# Patient Record
Sex: MALE | Race: WHITE | Employment: FULL TIME | URBAN - METROPOLITAN AREA
[De-identification: names, ages, dates, MRNs, and addresses within clinical notes are randomized per-mention and may not be internally consistent; named-entity substitution may affect disease eponyms.]

---

## 2017-05-18 ENCOUNTER — OFFICE VISIT (OUTPATIENT)
Dept: FAMILY MEDICINE | Facility: OTHER | Age: 32
End: 2017-05-18

## 2017-05-18 VITALS
OXYGEN SATURATION: 92 % | HEART RATE: 89 BPM | SYSTOLIC BLOOD PRESSURE: 126 MMHG | WEIGHT: 266 LBS | RESPIRATION RATE: 16 BRPM | DIASTOLIC BLOOD PRESSURE: 84 MMHG | BODY MASS INDEX: 36.08 KG/M2 | TEMPERATURE: 98.3 F

## 2017-05-18 DIAGNOSIS — R51.9 NONINTRACTABLE HEADACHE, UNSPECIFIED CHRONICITY PATTERN, UNSPECIFIED HEADACHE TYPE: Primary | ICD-10-CM

## 2017-05-18 PROCEDURE — 99207 ZZC NO BILLABLE SERVICE THIS VISIT: CPT | Performed by: FAMILY MEDICINE

## 2017-05-18 ASSESSMENT — PAIN SCALES - GENERAL: PAINLEVEL: MODERATE PAIN (5)

## 2017-05-18 NOTE — NURSING NOTE
Chief Complaint   Patient presents with     Eye Exam For Headaches     Health Maintenance       Initial /84 (BP Location: Right arm, Patient Position: Chair, Cuff Size: Adult Large)  Pulse 89  Temp 98.3  F (36.8  C) (Temporal)  Resp 16  Wt 266 lb (120.7 kg)  SpO2 92%  BMI 36.08 kg/m2 Estimated body mass index is 36.08 kg/(m^2) as calculated from the following:    Height as of 5/9/16: 6' (1.829 m).    Weight as of this encounter: 266 lb (120.7 kg).  Medication Reconciliation: complete   Shelia Tyler CMA (AAMA)

## 2017-05-18 NOTE — MR AVS SNAPSHOT
After Visit Summary   5/18/2017    Moris FARIA [place]    MRN: [id number]           Patient Information     Date Of Birth          1985        Today's Diagnoses     Nonintractable headache, unspecified chronicity pattern, unspecified headache type    -  1       Follow-ups after your visit        Who to contact     If you have questions or need follow up information about today's clinic visit or your schedule please contact [facility] [place] directly at [phone].  Normal or non-critical lab and imaging results will be communicated to you by [email or web address], letter or phone within 4 business days after the clinic has received the results. If you do not hear from us within 7 days, please contact the clinic through Evit or phone. If you have a critical or abnormal lab result, we will notify you by phone as soon as possible.  Submit refill requests through RPost or call your pharmacy and they will forward the refill request to us. Please allow 3 business days for your refill to be completed.          Additional Information About Your Visit        [email or web address] Information     RPost gives you secure access to your electronic health record. If you see a primary care provider, you can also send messages to your care team and make appointments. If you have questions, please call your primary care clinic.  If you do not have a primary care provider, please call [phone] and they will assist you.        Care EveryWhere ID     This is your Care EveryWhere ID. This could be used by other organizations to access your [place] medical records  [id number]        Your Vitals Were     Pulse Temperature Respirations Pulse Oximetry BMI (Body Mass Index)       89 98.3  F (36.8  C) (Temporal) 16 92% 36.08 kg/m2        Blood Pressure from Last 3 Encounters:   05/18/17 126/84   10/21/16 (!) 149/100   10/17/16 (!) 144/109    Weight from Last 3 Encounters:   05/18/17 266 lb (120.7 kg)   10/17/16 280 lb (127  kg)   10/04/16 288 lb (130.6 kg)              Today, you had the following     No orders found for display       Primary Care Provider Office Phone # Fax #    Yaritza Castorena [phone]       [facility]  [address] [facility] [address]        Thank you!     Thank you for choosing [facility]  for your care. Our goal is always to provide you with excellent care. Hearing back from our patients is one way we can continue to improve our services. Please take a few minutes to complete the written survey that you may receive in the mail after your visit with us. Thank you!             Your Updated Medication List - Protect others around you: Learn how to safely use, store and throw away your medicines at www.disposemymeds.org.          This list is accurate as of: 5/18/17  3:32 PM.  Always use your most recent med list.                   Brand Name Dispense Instructions for use    aspirin-acetaminophen-caffeine 250-250-65 MG per tablet    EXCEDRIN MIGRAINE     Take 1 tablet by mouth every 6 hours as needed for headaches Reported on 5/18/2017       GABAPENTIN PO      Reported on 5/18/2017       ibuprofen 200 MG tablet    ADVIL/MOTRIN    1 tablet    Take 4 tablets (800 mg) by mouth every 8 hours as needed for mild pain       oxyCODONE-acetaminophen 5-325 MG per tablet    PERCOCET    20 tablet    Take 1-2 tablets by mouth every 6 hours as needed for moderate to severe pain       TIZANIDINE HCL PO      Reported on 5/18/2017

## 2017-05-18 NOTE — PROGRESS NOTES
SUBJECTIVE:                                                    Moris Bowers is a 31 year old male who presents to clinic today for the following health issues:      HPI    Headache     Onset: x 4 days    Description:   Location: Right side of head/frontal   Character: dull pain, sharp pain  Frequency:  Daily  Duration:  Once takes Ibuprofen headache is gone within 45 minutes    Intensity: moderate, severe, 5/10    Progression of Symptoms:  worsening    Accompanying Signs & Symptoms:  Stiff neck: YES  Neck or upper back pain: YES  Fever: no  Sinus pressure: YES  Nausea or vomiting: no  Dizziness: YES- When patient first stands up  Numbness: no  Weakness: no  Visual changes: YES- Right Eye Seems more blurry- does wear contacts   History:   Head trauma: YES- concussions  Family history of migraines: no  Previous tests for headaches: no  Neurologist evaluations: no  Able to do daily activities: YES  Wake with a headaches: YES  Do headaches wake you up: YES  Daily pain medication use: YES- Ibuprofen as needed  Work/school stressors/changes: YES- Daily with work    Precipitating factors:   Does light make it worse: no  Does sound make it worse: no    Alleviating factors:  Does sleep help: YES         Therapies Tried and outcome: Ibuprofen (Advil, Motrin), Aleve      Problem list and histories reviewed & adjusted, as indicated.  Additional history: as documented  Pt left before being seen

## 2017-05-26 ENCOUNTER — OFFICE VISIT (OUTPATIENT)
Dept: FAMILY MEDICINE | Facility: OTHER | Age: 32
End: 2017-05-26
Payer: COMMERCIAL

## 2017-05-26 VITALS
OXYGEN SATURATION: 98 % | RESPIRATION RATE: 16 BRPM | TEMPERATURE: 95.6 F | HEART RATE: 113 BPM | WEIGHT: 262 LBS | DIASTOLIC BLOOD PRESSURE: 88 MMHG | HEIGHT: 71 IN | BODY MASS INDEX: 36.68 KG/M2 | SYSTOLIC BLOOD PRESSURE: 134 MMHG

## 2017-05-26 DIAGNOSIS — R51.9 NONINTRACTABLE HEADACHE, UNSPECIFIED CHRONICITY PATTERN, UNSPECIFIED HEADACHE TYPE: ICD-10-CM

## 2017-05-26 DIAGNOSIS — J02.9 VIRAL PHARYNGITIS: Primary | ICD-10-CM

## 2017-05-26 LAB
DEPRECATED S PYO AG THROAT QL EIA: NORMAL
MICRO REPORT STATUS: NORMAL
SPECIMEN SOURCE: NORMAL

## 2017-05-26 PROCEDURE — 87880 STREP A ASSAY W/OPTIC: CPT | Performed by: NURSE PRACTITIONER

## 2017-05-26 PROCEDURE — 99213 OFFICE O/P EST LOW 20 MIN: CPT | Performed by: NURSE PRACTITIONER

## 2017-05-26 PROCEDURE — 87081 CULTURE SCREEN ONLY: CPT | Performed by: NURSE PRACTITIONER

## 2017-05-26 ASSESSMENT — PAIN SCALES - GENERAL: PAINLEVEL: MILD PAIN (2)

## 2017-05-26 NOTE — NURSING NOTE
"Chief Complaint   Patient presents with     Pharyngitis       Initial /88  Pulse 113  Temp 95.6  F (35.3  C) (Temporal)  Resp 16  Ht 5' 11.18\" (1.808 m)  Wt 262 lb (118.8 kg)  SpO2 98%  BMI 36.36 kg/m2 Estimated body mass index is 36.36 kg/(m^2) as calculated from the following:    Height as of this encounter: 5' 11.18\" (1.808 m).    Weight as of this encounter: 262 lb (118.8 kg).  Medication Reconciliation: complete  Danielle Mcqueen CMA (AAMA)    "

## 2017-05-26 NOTE — PROGRESS NOTES
"  SUBJECTIVE:                                                    Moris Bowers is a 31 year old male who presents to clinic today for the following health issues:    Acute Illness   Acute illness concerns: sore throat  Onset: yesterday    Fever: no    Chills/Sweats: YES    Headache (location?): YES- last week     Sinus Pressure:no    Conjunctivitis:  YES: Right blurry with headache    Ear Pain: no    Rhinorrhea: no    Congestion: YES    Sore Throat: YES     Cough: YES - productive from chest dark green    Wheeze: YES    Decreased Appetite: no    Nausea: no    Vomiting: no    Diarrhea:  no    Dysuria/Freq.: no    Fatigue/Achiness: no    Sick/Strep Exposure: no     Therapies Tried and outcome: mucinex, coldeze nothing is working.  Came in contact with a deer at work and that's concerning.     Symptoms started yesterday with sore throat. Headache for the last two weeks- Ibuprofen and Excedrin. History of headaches. Intermittently has blurred vision with the headaches. Right now his vision is fine. Positive for productive cough. Congestion. White spots on the back of his throat.     Headaches: Currently has a mild headache. He reports some blurred vision with these headaches. He reports the headaches located on his right side. No nausea or vomiting. He has not been seen for these although this is not new to him.     Problem list and histories reviewed & adjusted, as indicated.  Additional history: as documented    Reviewed and updated as needed this visit by clinical staff       Reviewed and updated as needed this visit by Provider         ROS:  C: NEGATIVE for fever, chills, change in weight    OBJECTIVE:                                                    /88  Pulse 113  Temp 95.6  F (35.3  C) (Temporal)  Resp 16  Ht 5' 11.18\" (1.808 m)  Wt 262 lb (118.8 kg)  SpO2 98%  BMI 36.36 kg/m2  Body mass index is 36.36 kg/(m^2).  Physical Exam   Constitutional: Vital signs are normal.   HENT:   Head: " Normocephalic.   Nose: Nose normal.   Mouth/Throat: Uvula is midline and mucous membranes are normal. No oropharyngeal exudate, posterior oropharyngeal edema, posterior oropharyngeal erythema or tonsillar abscesses.   Eyes: Lids are normal. Pupils are equal, round, and reactive to light.   Cardiovascular: Normal rate, regular rhythm and normal heart sounds.    Pulmonary/Chest: Effort normal and breath sounds normal.   Lymphadenopathy:        Head (right side): No submental, no submandibular, no tonsillar, no preauricular, no posterior auricular and no occipital adenopathy present.        Head (left side): No submental, no submandibular, no tonsillar, no preauricular, no posterior auricular and no occipital adenopathy present.     He has no cervical adenopathy.       Diagnostic Test Results:  Results for orders placed or performed in visit on 05/26/17 (from the past 24 hour(s))   Rapid strep screen   Result Value Ref Range    Specimen Description Throat     Rapid Strep A Screen       NEGATIVE: No Group A streptococcal antigen detected by immunoassay, await   culture report.      Micro Report Status FINAL 05/26/2017         ASSESSMENT/PLAN:                                                        1. Viral pharyngitis  - Strep negative. Throat exam normal other than some redness. Will culture and let patient know if results show strep throat.   -Continue with conservative management.   - Rapid strep screen  - Beta strep group A culture    2. Nonintractable headache, unspecified chronicity pattern, unspecified headache type  - Offered brain MRI to evaluate for etiology of headaches, patient declined this. I discussed with him in length why I would like a further work up and the risks. Patient is aware. I am very concerned about these headaches. He did agree to a follow up visit next week with PCP. He had an appointment last week for this and walked out on it as he waited too long.   - Continue to monitor symptoms. It is  reassuring that your headache has improved.     Patient did not agree to MRI for headaches. I was able to convince him to come back for an OV to discuss this.  The patient indicates understanding of these issues and agrees with the plan.      Patient Instructions   - Strep negative, recommend continuing with the below care including salt water gargling and monitoring symptoms.   - Your headaches are concerning, I would recommend further work up for this including a MRI of the brain, you have declined this today. If you change your mind please let me know.   - Follow up next week with PCP to discuss headaches.     Viral Pharyngitis (Sore Throat)    You have pharyngitis (sore throat). This infection is caused by a virus. It can cause throat pain that is worse when swallowing, aching all over, headache, and fever. The infection may be spread by coughing, kissing, or touching others after touching your mouth or nose. Antibiotic medications do not work against viruses, so they are not used for treating this condition.  Home care    If your symptoms are severe, rest at home. Return to work or school when you feel well enough.     Drink plenty of fluids to avoid dehydration.    For adults: You may use acetaminophen or ibuprofen to control pain or fever, unless another medicine was prescribed for this. (NOTE: If you have chronic liver or kidney disease or ever had a stomach ulcer or GI bleeding, talk with your doctor before using these medicines.)    Throat lozenges or numbing throat sprays can help reduce pain. Gargling with warm salt water will also help reduce throat pain. For this, dissolve 1/2 teaspoon of salt in 1 glass of warm water. To help soothe a sore throat, children can sip on juice or a popsicle. Children 5 years and older can also suck on a lollipop or hard candy.    Avoid salty or spicy foods, which can be irritating to the throat.  Follow-up care  Follow up with your healthcare provider or our staff if you  are not improving over the next week.  When to seek medical advice  Call your healthcare provider right away if any of these occur:    Fever as directed by your doctor.  For children, seek care if:    Your child is of any age and has repeated fevers above 104 F (40 C).    Your child is younger than 2 years of age and has a fever of 100.4 F (38 C) that continues for more than 1 day.    Your child is 2 years old or older and has a fever of 100.4 F (38 C) that continues for more than 3 days.    New or worsening ear pain, sinus pain, or headache    Painful lumps in the back of neck    Stiff neck    Lymph nodes are getting larger    Inability to swallow liquids, excessive drooling, or inability to open mouth wide due to throat pain    Signs of dehydration (very dark urine or no urine, sunken eyes, dizziness)    Trouble breathing or noisy breathing    Muffled voice    New rash    Child appears to be getting sicker    2247-0265 [employer]. [address] [place] [address], [place] [address]. All rights reserved. This information is not intended as a substitute for professional medical care. Always follow your healthcare professional's instructions.            HALEIGH Macedo [facility]

## 2017-05-26 NOTE — PATIENT INSTRUCTIONS
- Strep negative, recommend continuing with the below care including salt water gargling and monitoring symptoms.   - Your headaches are concerning, I would recommend further work up for this including a MRI of the brain, you have declined this today. If you change your mind please let me know.   - Follow up next week with PCP to discuss headaches.     Viral Pharyngitis (Sore Throat)    You have pharyngitis (sore throat). This infection is caused by a virus. It can cause throat pain that is worse when swallowing, aching all over, headache, and fever. The infection may be spread by coughing, kissing, or touching others after touching your mouth or nose. Antibiotic medications do not work against viruses, so they are not used for treating this condition.  Home care    If your symptoms are severe, rest at home. Return to work or school when you feel well enough.     Drink plenty of fluids to avoid dehydration.    For adults: You may use acetaminophen or ibuprofen to control pain or fever, unless another medicine was prescribed for this. (NOTE: If you have chronic liver or kidney disease or ever had a stomach ulcer or GI bleeding, talk with your doctor before using these medicines.)    Throat lozenges or numbing throat sprays can help reduce pain. Gargling with warm salt water will also help reduce throat pain. For this, dissolve 1/2 teaspoon of salt in 1 glass of warm water. To help soothe a sore throat, children can sip on juice or a popsicle. Children 5 years and older can also suck on a lollipop or hard candy.    Avoid salty or spicy foods, which can be irritating to the throat.  Follow-up care  Follow up with your healthcare provider or our staff if you are not improving over the next week.  When to seek medical advice  Call your healthcare provider right away if any of these occur:    Fever as directed by your doctor.  For children, seek care if:    Your child is of any age and has repeated fevers above 104 F  (40 C).    Your child is younger than 2 years of age and has a fever of 100.4 F (38 C) that continues for more than 1 day.    Your child is 2 years old or older and has a fever of 100.4 F (38 C) that continues for more than 3 days.    New or worsening ear pain, sinus pain, or headache    Painful lumps in the back of neck    Stiff neck    Lymph nodes are getting larger    Inability to swallow liquids, excessive drooling, or inability to open mouth wide due to throat pain    Signs of dehydration (very dark urine or no urine, sunken eyes, dizziness)    Trouble breathing or noisy breathing    Muffled voice    New rash    Child appears to be getting sicker    1682-6262 [employer]. [address] [place] [address], [place] [address]. All rights reserved. This information is not intended as a substitute for professional medical care. Always follow your healthcare professional's instructions.

## 2017-05-26 NOTE — MR AVS SNAPSHOT
After Visit Summary   5/26/2017    Moris GIANA Vincent    MRN: [id number]           Patient Information     Date Of Birth          1985        Visit Information        Provider Department      5/26/2017 1:20 PM Fina Carson APRN [facility]        Today's Diagnoses     Throat pain    -  1    Nonintractable headache, unspecified chronicity pattern, unspecified headache type          Care Instructions    - Strep negative, recommend continuing with the below care including salt water gargling and monitoring symptoms.   - Your headaches are concerning, I would recommend further work up for this including a MRI of the brain, you have declined this today. If you change your mind please let me know.   - Follow up next week with PCP to discuss headaches.     Viral Pharyngitis (Sore Throat)    You have pharyngitis (sore throat). This infection is caused by a virus. It can cause throat pain that is worse when swallowing, aching all over, headache, and fever. The infection may be spread by coughing, kissing, or touching others after touching your mouth or nose. Antibiotic medications do not work against viruses, so they are not used for treating this condition.  Home care    If your symptoms are severe, rest at home. Return to work or school when you feel well enough.     Drink plenty of fluids to avoid dehydration.    For adults: You may use acetaminophen or ibuprofen to control pain or fever, unless another medicine was prescribed for this. (NOTE: If you have chronic liver or kidney disease or ever had a stomach ulcer or GI bleeding, talk with your doctor before using these medicines.)    Throat lozenges or numbing throat sprays can help reduce pain. Gargling with warm salt water will also help reduce throat pain. For this, dissolve 1/2 teaspoon of salt in 1 glass of warm water. To help soothe a sore throat, children can sip on juice or a popsicle. Children 5 years and older can  also suck on a lollipop or hard candy.    Avoid salty or spicy foods, which can be irritating to the throat.  Follow-up care  Follow up with your healthcare provider or our staff if you are not improving over the next week.  When to seek medical advice  Call your healthcare provider right away if any of these occur:    Fever as directed by your doctor.  For children, seek care if:    Your child is of any age and has repeated fevers above 104 F (40 C).    Your child is younger than 2 years of age and has a fever of 100.4 F (38 C) that continues for more than 1 day.    Your child is 2 years old or older and has a fever of 100.4 F (38 C) that continues for more than 3 days.    New or worsening ear pain, sinus pain, or headache    Painful lumps in the back of neck    Stiff neck    Lymph nodes are getting larger    Inability to swallow liquids, excessive drooling, or inability to open mouth wide due to throat pain    Signs of dehydration (very dark urine or no urine, sunken eyes, dizziness)    Trouble breathing or noisy breathing    Muffled voice    New rash    Child appears to be getting sicker    9690-3753 [employer]. [address] [place] [address]. All rights reserved. This information is not intended as a substitute for professional medical care. Always follow your healthcare professional's instructions.                Follow-ups after your visit        Follow-up notes from your care team     Return if symptoms worsen or fail to improve.      Your next 10 appointments already scheduled     Jun 01, 2017  3:15 PM CDT   Office Visit with Yaritza Castorena MD   [facility]    [address] [place] [id number]   [phone]           Bring a current list of meds and any records pertaining to this visit.  For Physicals, please bring immunization records and any forms needing to be filled out.  Please arrive 10 minutes early to complete  "paperwork.              Who to contact     If you have questions or need follow up information about today's clinic visit or your schedule please contact [facility] [place] directly at [phone].  Normal or non-critical lab and imaging results will be communicated to you by MyChart, letter or phone within 4 business days after the clinic has received the results. If you do not hear from us within 7 days, please contact the clinic through MyChart or phone. If you have a critical or abnormal lab result, we will notify you by phone as soon as possible.  Submit refill requests through [email or web address] or call your pharmacy and they will forward the refill request to us. Please allow 3 business days for your refill to be completed.          Additional Information About Your Visit        ExpanharBerggi Information     Voya.ge gives you secure access to your electronic health record. If you see a primary care provider, you can also send messages to your care team and make appointments. If you have questions, please call your primary care clinic.  If you do not have a primary care provider, please call [phone] and they will assist you.        Care EveryWhere ID     This is your Care EveryWhere ID. This could be used by other organizations to access your [place] medical records  [id number]        Your Vitals Were     Pulse Temperature Respirations Height Pulse Oximetry BMI (Body Mass Index)    113 95.6  F (35.3  C) (Temporal) 16 5' 11.18\" (1.808 m) 98% 36.36 kg/m2       Blood Pressure from Last 3 Encounters:   05/26/17 134/88   05/18/17 126/84   10/21/16 (!) 149/100    Weight from Last 3 Encounters:   05/26/17 262 lb (118.8 kg)   05/18/17 266 lb (120.7 kg)   10/17/16 280 lb (127 kg)              We Performed the Following     Beta strep group A culture     Rapid strep screen        Primary Care Provider Office Phone # Fax #    Yaritza Castorena [phone]       [facility]  [address]  " [facility] [address]        Thank you!     Thank you for choosing [facility]  for your care. Our goal is always to provide you with excellent care. Hearing back from our patients is one way we can continue to improve our services. Please take a few minutes to complete the written survey that you may receive in the mail after your visit with us. Thank you!             Your Updated Medication List - Protect others around you: Learn how to safely use, store and throw away your medicines at www.disposemymeds.org.          This list is accurate as of: 5/26/17  1:40 PM.  Always use your most recent med list.                   Brand Name Dispense Instructions for use    aspirin-acetaminophen-caffeine 250-250-65 MG per tablet    EXCEDRIN MIGRAINE     Take 1 tablet by mouth every 6 hours as needed for headaches Reported on 5/18/2017       GABAPENTIN PO      Reported on 5/18/2017       ibuprofen 200 MG tablet    ADVIL/MOTRIN    1 tablet    Take 4 tablets (800 mg) by mouth every 8 hours as needed for mild pain       TIZANIDINE HCL PO      Reported on 5/18/2017

## 2017-05-28 LAB
BACTERIA SPEC CULT: NORMAL
MICRO REPORT STATUS: NORMAL
SPECIMEN SOURCE: NORMAL

## 2017-09-20 ENCOUNTER — OFFICE VISIT (OUTPATIENT)
Dept: FAMILY MEDICINE | Facility: OTHER | Age: 32
End: 2017-09-20
Payer: COMMERCIAL

## 2017-09-20 ENCOUNTER — RADIANT APPOINTMENT (OUTPATIENT)
Dept: GENERAL RADIOLOGY | Facility: OTHER | Age: 32
End: 2017-09-20
Attending: STUDENT IN AN ORGANIZED HEALTH CARE EDUCATION/TRAINING PROGRAM
Payer: COMMERCIAL

## 2017-09-20 VITALS
DIASTOLIC BLOOD PRESSURE: 90 MMHG | BODY MASS INDEX: 36.57 KG/M2 | SYSTOLIC BLOOD PRESSURE: 132 MMHG | TEMPERATURE: 98.8 F | HEART RATE: 77 BPM | OXYGEN SATURATION: 97 % | HEIGHT: 71 IN | WEIGHT: 261.2 LBS

## 2017-09-20 DIAGNOSIS — M54.6 ACUTE LEFT-SIDED THORACIC BACK PAIN: Primary | ICD-10-CM

## 2017-09-20 DIAGNOSIS — M54.9 CHRONIC BACK PAIN, UNSPECIFIED BACK LOCATION, UNSPECIFIED BACK PAIN LATERALITY: ICD-10-CM

## 2017-09-20 DIAGNOSIS — G89.29 CHRONIC BACK PAIN, UNSPECIFIED BACK LOCATION, UNSPECIFIED BACK PAIN LATERALITY: ICD-10-CM

## 2017-09-20 DIAGNOSIS — E66.09 NON MORBID OBESITY DUE TO EXCESS CALORIES: ICD-10-CM

## 2017-09-20 DIAGNOSIS — M54.6 ACUTE LEFT-SIDED THORACIC BACK PAIN: ICD-10-CM

## 2017-09-20 PROCEDURE — 72072 X-RAY EXAM THORAC SPINE 3VWS: CPT

## 2017-09-20 PROCEDURE — 99213 OFFICE O/P EST LOW 20 MIN: CPT | Performed by: STUDENT IN AN ORGANIZED HEALTH CARE EDUCATION/TRAINING PROGRAM

## 2017-09-20 RX ORDER — CYCLOBENZAPRINE HCL 10 MG
5-10 TABLET ORAL 3 TIMES DAILY PRN
Qty: 90 TABLET | Refills: 0 | Status: SHIPPED | OUTPATIENT
Start: 2017-09-20 | End: 2017-09-25 | Stop reason: SINTOL

## 2017-09-20 RX ORDER — OXYCODONE AND ACETAMINOPHEN 5; 325 MG/1; MG/1
1-2 TABLET ORAL EVERY 6 HOURS PRN
Qty: 30 TABLET | Refills: 0 | Status: SHIPPED | OUTPATIENT
Start: 2017-09-20 | End: 2017-09-25

## 2017-09-20 RX ORDER — METHYLPREDNISOLONE 4 MG
TABLET, DOSE PACK ORAL
Qty: 21 TABLET | Refills: 0 | Status: SHIPPED | OUTPATIENT
Start: 2017-09-20 | End: 2017-09-25

## 2017-09-20 ASSESSMENT — PAIN SCALES - GENERAL: PAINLEVEL: SEVERE PAIN (7)

## 2017-09-20 NOTE — MR AVS SNAPSHOT
After Visit Summary   9/20/2017    Moris Bowers    MRN: [id number]           Patient Information     Date Of Birth          1985        Visit Information        Provider Department      9/20/2017 10:00 AM Sienna Rizzo APRN [facility]        Today's Diagnoses     Acute left-sided thoracic back pain    -  1      Care Instructions    Medrol dose pack - oral steroid     Cyclobenzaprine - three times daily as needed for muscle spasm    Percocet as needed every 6 hours. Use sparingly.    Back x-ray today.  Will call with results.    Do gentle stretching of the back to prevent stiffening of muscles.    Sienna Rizzo, NP-C               Low Back Pain            What is low back pain?   Low back pain is pain and stiffness in the lower back. It is one of the most common reasons people miss work.   How does it occur?   Your lower back is called your lumbar spine. It is made up of 5 bones called lumbar vertebrae. In between the vertebrae are shock absorbers called disks. Back pain can occur from an injury to the vertebrae or when a disk bulges or herniates.   Low back pain is usually caused when a ligament or muscle holding a vertebra in its proper position is strained. Vertebrae are bones that make up the spinal column through which the spinal cord passes. When these muscles or ligaments become weak or strained, the spine loses its stability, resulting in pain.   Low back pain can occur if your job involves lifting and carrying heavy objects, or if you spend a lot of time sitting or standing in one position or bending over. It can be caused by a fall or by unusually strenuous exercise. It can be brought on by the tension and stress that cause headaches in some people. It can even be brought on by violent sneezing or coughing.   People who are overweight may have low back pain because of the added stress on their back.   Back pain may occur when the muscles,  joints, bones, and connective tissues of the back become inflamed as a result of an infection or an immune system problem. Arthritic disorders as well as some congenital and degenerative conditions may cause back pain.   Back pain accompanied by loss of bladder or bowel control, trouble moving your legs, or numbness or tingling in your arms or legs requires immediate medical treatment.   What are the symptoms?   Symptoms include:   pain in the back or legs   stiffness, spasm, or limited motion   The pain may be constant or may happen only in certain positions. It may get worse when you cough, sneeze, bend, twist, or strain during a bowel movement. The pain may be in only one spot or may spread to other areas, most commonly down the buttocks and into the back of the thigh.   A low back strain typically does not produce pain past the knee into the calf or foot. Tingling or numbness in the calf or foot may indicate a herniated disk or pinched nerve.   Be sure to see your healthcare provider if:   You have weakness in your leg, especially if you cannot lift your foot, because this may be a sign of nerve damage.   You have new bowel or bladder problems as well as back pain, which may be a sign of severe injury to your spinal cord.   You have pain that gets worse despite treatment.   How is it diagnosed?   Your healthcare provider will review your medical history and examine you. You may have X-rays, an MRI, CT scan, or a bone scan.   How is it treated?   To treat this condition:   Put an ice pack, gel pack, or package of frozen vegetables, wrapped in a cloth on the area every 3 to 4 hours, for up to 20 minutes at a time for the first 2 or 3 days.   Use a heating pad or hot water bottle. Don't let the heating pad get too hot, and don't fall asleep with it. You could get a burn.   Rest in bed on a firm mattress. Often it helps to lie on your back with your knees raised on a pillow. However, some people prefer to lie on  their side with their knees bent. It's best to try to stay active, so try not to rest in bed longer than 1 to 2 days.   Take muscle relaxants as recommended by your healthcare provider.   Take an anti-inflammatory such as ibuprofen, or other medicine as directed by your provider. Nonsteroidal anti-inflammatory medicines (NSAIDs) may cause stomach bleeding and other problems. These risks increase with age. Read the label and take as directed. Unless recommended by your healthcare provider, do not take for more than 10 days.   Get a back massage by a trained person.   Wear a belt or corset to support your back.   Do the exercises recommended by your provider. Your provider may also prescribe physical therapy.   Talk with a counselor, if your back pain is related to tension caused by emotional problems.   When the pain is gone, ask your healthcare provider about starting an exercise program such as the following:   Exercise moderately every day, using stretching and warm-up exercises suggested by your provider or physical therapist.   Exercise vigorously for about 30 minutes 3 times a week by walking, swimming, using a stationary bicycle, or doing low-impact aerobics.   Exercising regularly will not only help your back, it will also help keep you healthier overall.   How long will the effects last?   The effects of back pain last as long as the cause exists or until your body recovers from the strain, usually a day or two but sometimes weeks.   How can I take care of myself?   In addition to the treatment described above, keep in mind these suggestions:   Practice good posture. Stand with your head up, shoulders straight, chest forward, weight balanced evenly on both feet, and pelvis tucked in.   Lose weight if you are overweight   Keep your core muscles strong. These are your abdominal and back muscles.   Sleep without a pillow under your head.   Pain is the best way to  the pace you should set in increasing your  activity and exercise. Minor discomfort, stiffness, soreness, and mild aches need not interfere with activity. However, limit your activities temporarily if:   Your symptoms return.   The pain increases when you are more active.   The pain increases within 24 hours after a new or higher level of activity.   When can I return to my normal activities?   Everyone recovers from an injury at a different rate. Return to your activities depends on how soon your back recovers, not by how many days or weeks it has been since your injury has occurred. In general, the longer you have symptoms before you start treatment, the longer it will take to get better. The goal is to return to your normal activities as soon as is safely possible. If you return too soon you may worsen your injury.   It is important that you have fully recovered from your low back pain before you return to any strenuous activity. You must be able to have the same range of motion that you had before your injury. You must be able to walk and twist without pain.   What can I do to help prevent low back pain?   You can reduce the strain on your back by doing the following:   Don't push with your arms when you move a heavy object. Turn around and push backwards so the strain is taken by your legs.   Whenever you sit, sit in a straight-backed chair and hold your spine against the back of the chair.   Bend your knees and hips and keep your back straight when you lift a heavy object.   Avoid lifting heavy objects higher than your waist.   Hold packages you carry close to your body, with your arms bent.   Use a footrest for one foot when you stand or sit in one spot for a long time. This keeps your back straight.   Bend your knees when you bend over.   Sit close to the pedals when you drive and use your seat belt and a hard backrest or pillow.   Lie on your side with your knees bent when you sleep or rest. It may help to put a pillow between your knees.   Put a  pillow under your knees when you sleep on your back.   Raise the foot of the bed 8 inches to discourage sleeping on your stomach unless you have other problems that require that you keep your head elevated.   To rest your back, hold each of these positions for 5?minutes or longer:   Lie on your back, bend your knees, and put pillows under your knees.   Lie on your back on the floor with a pillow under your neck. Bend your knees to a 90-degree angle, and put your lower legs and feet on a chair.   Lie on your back, bend your knees, and bring one knee up to your chest and hold it there. Repeat with the other knee, then bring both knees to your chest. When holding your knee to your chest, grab your thigh rather than your lower leg to avoid over flexing your knee.     Published by [employer].  This content is reviewed periodically and is subject to change as new health information becomes available. The information is intended to inform and educate and is not a replacement for medical evaluation, advice, diagnosis or treatment by a healthcare professional.   Developed by Renetta Carvajal RN, [place], and [facility].   ? 2010 [facility] and/or its affiliates. All Rights Reserved.           Low Back Pain Exercise          Standing hamstring stretch: Put the heel of one leg on a stool about 15 inches high. Keep your leg straight. Lean forward, bending at the hips until you feel a mild stretch in the back of your thigh. Make sure you do not roll your shoulders or bend at the waist when doing this. You want to stretch your leg, not your lower back. Hold the stretch for 15 to 30 seconds. Repeat with each leg 3 times.   Cat and camel: Get down on your hands and knees. Let your stomach sag, allowing your back to curve downward. Hold this position for 5 seconds. Then arch your back and hold for 5 seconds. Do 3 sets of 10.   Quadruped arm and leg raise: Get down on your hands and knees. Pull in your belly button and tighten your  abdominal muscles to stiffen your spine. While keeping your abdominals tight, raise one arm and the opposite leg away from you. Hold this position for 5 seconds. Lower your arm and leg slowly and change sides. Do this 10 times on each side.   Pelvic tilt: Lie on your back with your knees bent and your feet flat on the floor. Tighten your abdominal muscles and push your lower back into the floor. Hold this position for 5 seconds, then relax. Do 3 sets of 10.   Partial curl: Lie on your back with your knees bent and your feet flat on the floor. Tighten your stomach muscles. Tuck your chin to your chest. With your hands stretched out in front of you, curl your upper body forward until your shoulders clear the floor. Hold this position for 3 seconds. Don't hold your breath. It helps to breathe out as you lift your shoulders up. Relax back to the floor. Repeat 10 times. Build to 3 sets of 10. To challenge yourself, clasp your hands behind your head and keep your elbows out to the side.   Gluteal stretch: Lie on your back with both knees bent. Rest the ankle of one leg over the knee of your other leg. Grasp the thigh of the bottom leg and pull toward your chest. You will feel a stretch along the buttocks and possibly along the outside of your hip. Hold the stretch for 15 to 30 seconds. Repeat 3 times with each leg.   Extension exercise:   0. Lie face down on the floor for 5 minutes. If this hurts too much, lie face down with a pillow under your stomach. This should relieve your leg or back pain. When you can lie on your stomach for 5 minutes without a pillow, you can continue with Part B of this exercise.   0. After lying on your stomach for 5 minutes, prop yourself up on your elbows for another 5 minutes. If you can do this without having more leg or buttock pain, you can start doing part C of this exercise.   0. Lie on your stomach with your hands under your shoulders. Then press down on your hands and extend your  elbows while keeping your hips flat on the floor. Hold for 1 second and lower yourself to the floor. Do 3 to 5 sets of 10 repetitions. Rest for 1 minute between sets. You should have no pain in your legs when you do this, but it is normal to feel some pain in your lower back.   Do this exercise several times a day.   Side plank: Lie on your side with your legs, hips, and shoulders in a straight line. Prop yourself up onto your forearm so your elbow is directly under your shoulder. Lift your hips off the floor and balance on your forearm and the outside of your foot. Try to hold this position for 15 seconds, then slowly lower your hip to the ground. Switch sides and repeat. Work up to holding for 1 minute or longer. This exercise can be made easier by starting with your knees and hips flexed toward your chest.   Published by [employer].  This content is reviewed periodically and is subject to change as new health information becomes available. The information is intended to inform and educate and is not a replacement for medical evaluation, advice, diagnosis or treatment by a healthcare professional.   Written by Ivana Bolton, MS, PT, and Renetta Ramachandran, PT, [facility], \[facility] [place] [facility]\"", for [employer]   ? 2010 [facility] and/or its affiliates. All Rights Reserved.         Copyright   [employer] 2011                      Follow-ups after your visit        Future tests that were ordered for you today     Open Future Orders        Priority Expected Expires Ordered    XR Thoracic Spine 3 Views Routine 9/20/2017 9/20/2018 9/20/2017            Who to contact     If you have questions or need follow up information about today's clinic visit or your schedule please contact [facility] directly at [phone].  Normal or non-critical lab and imaging results will be communicated to you by MyChart, letter or phone within 4 business days after the clinic has received the results. If you do not hear from us within  "7 days, please contact the clinic through [facility] or phone. If you have a critical or abnormal lab result, we will notify you by phone as soon as possible.  Submit refill requests through [facility] or call your pharmacy and they will forward the refill request to us. Please allow 3 business days for your refill to be completed.          Additional Information About Your Visit        [employer] Information     [facility] gives you secure access to your electronic health record. If you see a primary care provider, you can also send messages to your care team and make appointments. If you have questions, please call your primary care clinic.  If you do not have a primary care provider, please call [phone] and they will assist you.        Care EveryWhere ID     This is your Care EveryWhere ID. This could be used by other organizations to access your [place] medical records  [id number]        Your Vitals Were     Pulse Temperature Height Pulse Oximetry BMI (Body Mass Index)       77 98.8  F (37.1  C) (Oral) 5' 11.18\" (1.808 m) 97% 36.24 kg/m2        Blood Pressure from Last 3 Encounters:   09/20/17 132/90   05/26/17 134/88   05/18/17 126/84    Weight from Last 3 Encounters:   09/20/17 261 lb 3.2 oz (118.5 kg)   05/26/17 262 lb (118.8 kg)   05/18/17 266 lb (120.7 kg)                 Today's Medication Changes          These changes are accurate as of: 9/20/17 10:31 AM.  If you have any questions, ask your nurse or doctor.               Start taking these medicines.        Dose/Directions    cyclobenzaprine 10 MG tablet   Commonly known as:  FLEXERIL   Used for:  Acute left-sided thoracic back pain   Started by:  Sienna Rizzo APRN CNP        Dose:  5-10 mg   Take 0.5-1 tablets (5-10 mg) by mouth 3 times daily as needed for muscle spasms   Quantity:  90 tablet   Refills:  0       methylPREDNISolone 4 MG tablet   Commonly known as:  MEDROL DOSEPAK   Used for:  Acute left-sided thoracic back pain   Started by:  " Sienna Rizzo APRN CNP        Follow package instructions   Quantity:  21 tablet   Refills:  0       oxyCODONE-acetaminophen 5-325 MG per tablet   Commonly known as:  PERCOCET   Used for:  Acute left-sided thoracic back pain   Started by:  Sienna Rizzo APRN CNP        Dose:  1-2 tablet   Take 1-2 tablets by mouth every 6 hours as needed for pain Maximum 8 tablet(s) per day. Use sparingly.   Quantity:  30 tablet   Refills:  0            Where to get your medicines      These medications were sent to [id number] - [place], [place] - [address] [address]  [address] [address] [address]     Phone:  [phone]     cyclobenzaprine 10 MG tablet    methylPREDNISolone 4 MG tablet         Some of these will need a paper prescription and others can be bought over the counter.  Ask your nurse if you have questions.     Bring a paper prescription for each of these medications     oxyCODONE-acetaminophen 5-325 MG per tablet                Primary Care Provider Office Phone # Fax #    Yaritza Castorena [phone]       [address] [place] [id number]        Equal Access to Services     [facility]: Hadii merlyn higgins hadasho Sojohn, waaxda luqadaha, qaybta kaalmada adeegyada, marcelino lea . So [facility] [phone].    ATENCIÓN: Si habla español, tiene a martinez disposición servicios gratuitos de asistencia lingüística. [facility] [phone].    We comply with applicable federal civil rights laws and [place] laws. We do not discriminate on the basis of race, color, national origin, age, disability sex, sexual orientation or gender identity.            Thank you!     Thank you for choosing [facility]  for your care. Our goal is always to provide you with excellent care. Hearing back from our patients is one way we can continue to improve our services. Please take a few minutes to complete the written survey that you may receive in the  mail after your visit with us. Thank you!             Your Updated Medication List - Protect others around you: Learn how to safely use, store and throw away your medicines at www.disposemymeds.org.          This list is accurate as of: 9/20/17 10:31 AM.  Always use your most recent med list.                   Brand Name Dispense Instructions for use Diagnosis    aspirin-acetaminophen-caffeine 250-250-65 MG per tablet    EXCEDRIN MIGRAINE     Take 1 tablet by mouth every 6 hours as needed for headaches Reported on 5/18/2017        cyclobenzaprine 10 MG tablet    FLEXERIL    90 tablet    Take 0.5-1 tablets (5-10 mg) by mouth 3 times daily as needed for muscle spasms    Acute left-sided thoracic back pain       GABAPENTIN PO      Reported on 5/18/2017        ibuprofen 200 MG tablet    ADVIL/MOTRIN    1 tablet    Take 4 tablets (800 mg) by mouth every 8 hours as needed for mild pain    Bilateral low back pain, with sciatica presence unspecified       methylPREDNISolone 4 MG tablet    MEDROL DOSEPAK    21 tablet    Follow package instructions    Acute left-sided thoracic back pain       oxyCODONE-acetaminophen 5-325 MG per tablet    PERCOCET    30 tablet    Take 1-2 tablets by mouth every 6 hours as needed for pain Maximum 8 tablet(s) per day. Use sparingly.    Acute left-sided thoracic back pain       TIZANIDINE HCL PO      Reported on 5/18/2017

## 2017-09-20 NOTE — NURSING NOTE
"Chief Complaint   Patient presents with     Back Pain       Initial /90 (BP Location: Left arm, Patient Position: Chair, Cuff Size: Adult Large)  Pulse 77  Temp 98.8  F (37.1  C) (Oral)  Ht 5' 11.18\" (1.808 m)  Wt 261 lb 3.2 oz (118.5 kg)  SpO2 97%  BMI 36.24 kg/m2 Estimated body mass index is 36.24 kg/(m^2) as calculated from the following:    Height as of this encounter: 5' 11.18\" (1.808 m).    Weight as of this encounter: 261 lb 3.2 oz (118.5 kg).  Medication Reconciliation: complete   Consuelo Chappell CMA      "

## 2017-09-20 NOTE — PATIENT INSTRUCTIONS
Medrol dose pack - oral steroid     Cyclobenzaprine - three times daily as needed for muscle spasm    Percocet as needed every 6 hours. Use sparingly.    Back x-ray today.  Will call with results.    Do gentle stretching of the back to prevent stiffening of muscles.    Sienna Rizzo, NP-C               Low Back Pain            What is low back pain?   Low back pain is pain and stiffness in the lower back. It is one of the most common reasons people miss work.   How does it occur?   Your lower back is called your lumbar spine. It is made up of 5 bones called lumbar vertebrae. In between the vertebrae are shock absorbers called disks. Back pain can occur from an injury to the vertebrae or when a disk bulges or herniates.   Low back pain is usually caused when a ligament or muscle holding a vertebra in its proper position is strained. Vertebrae are bones that make up the spinal column through which the spinal cord passes. When these muscles or ligaments become weak or strained, the spine loses its stability, resulting in pain.   Low back pain can occur if your job involves lifting and carrying heavy objects, or if you spend a lot of time sitting or standing in one position or bending over. It can be caused by a fall or by unusually strenuous exercise. It can be brought on by the tension and stress that cause headaches in some people. It can even be brought on by violent sneezing or coughing.   People who are overweight may have low back pain because of the added stress on their back.   Back pain may occur when the muscles, joints, bones, and connective tissues of the back become inflamed as a result of an infection or an immune system problem. Arthritic disorders as well as some congenital and degenerative conditions may cause back pain.   Back pain accompanied by loss of bladder or bowel control, trouble moving your legs, or numbness or tingling in your arms or legs requires immediate medical treatment.   What are  the symptoms?   Symptoms include:   pain in the back or legs   stiffness, spasm, or limited motion   The pain may be constant or may happen only in certain positions. It may get worse when you cough, sneeze, bend, twist, or strain during a bowel movement. The pain may be in only one spot or may spread to other areas, most commonly down the buttocks and into the back of the thigh.   A low back strain typically does not produce pain past the knee into the calf or foot. Tingling or numbness in the calf or foot may indicate a herniated disk or pinched nerve.   Be sure to see your healthcare provider if:   You have weakness in your leg, especially if you cannot lift your foot, because this may be a sign of nerve damage.   You have new bowel or bladder problems as well as back pain, which may be a sign of severe injury to your spinal cord.   You have pain that gets worse despite treatment.   How is it diagnosed?   Your healthcare provider will review your medical history and examine you. You may have X-rays, an MRI, CT scan, or a bone scan.   How is it treated?   To treat this condition:   Put an ice pack, gel pack, or package of frozen vegetables, wrapped in a cloth on the area every 3 to 4 hours, for up to 20 minutes at a time for the first 2 or 3 days.   Use a heating pad or hot water bottle. Don't let the heating pad get too hot, and don't fall asleep with it. You could get a burn.   Rest in bed on a firm mattress. Often it helps to lie on your back with your knees raised on a pillow. However, some people prefer to lie on their side with their knees bent. It's best to try to stay active, so try not to rest in bed longer than 1 to 2 days.   Take muscle relaxants as recommended by your healthcare provider.   Take an anti-inflammatory such as ibuprofen, or other medicine as directed by your provider. Nonsteroidal anti-inflammatory medicines (NSAIDs) may cause stomach bleeding and other problems. These risks increase with  age. Read the label and take as directed. Unless recommended by your healthcare provider, do not take for more than 10 days.   Get a back massage by a trained person.   Wear a belt or corset to support your back.   Do the exercises recommended by your provider. Your provider may also prescribe physical therapy.   Talk with a counselor, if your back pain is related to tension caused by emotional problems.   When the pain is gone, ask your healthcare provider about starting an exercise program such as the following:   Exercise moderately every day, using stretching and warm-up exercises suggested by your provider or physical therapist.   Exercise vigorously for about 30 minutes 3 times a week by walking, swimming, using a stationary bicycle, or doing low-impact aerobics.   Exercising regularly will not only help your back, it will also help keep you healthier overall.   How long will the effects last?   The effects of back pain last as long as the cause exists or until your body recovers from the strain, usually a day or two but sometimes weeks.   How can I take care of myself?   In addition to the treatment described above, keep in mind these suggestions:   Practice good posture. Stand with your head up, shoulders straight, chest forward, weight balanced evenly on both feet, and pelvis tucked in.   Lose weight if you are overweight   Keep your core muscles strong. These are your abdominal and back muscles.   Sleep without a pillow under your head.   Pain is the best way to  the pace you should set in increasing your activity and exercise. Minor discomfort, stiffness, soreness, and mild aches need not interfere with activity. However, limit your activities temporarily if:   Your symptoms return.   The pain increases when you are more active.   The pain increases within 24 hours after a new or higher level of activity.   When can I return to my normal activities?   Everyone recovers from an injury at a different  rate. Return to your activities depends on how soon your back recovers, not by how many days or weeks it has been since your injury has occurred. In general, the longer you have symptoms before you start treatment, the longer it will take to get better. The goal is to return to your normal activities as soon as is safely possible. If you return too soon you may worsen your injury.   It is important that you have fully recovered from your low back pain before you return to any strenuous activity. You must be able to have the same range of motion that you had before your injury. You must be able to walk and twist without pain.   What can I do to help prevent low back pain?   You can reduce the strain on your back by doing the following:   Don't push with your arms when you move a heavy object. Turn around and push backwards so the strain is taken by your legs.   Whenever you sit, sit in a straight-backed chair and hold your spine against the back of the chair.   Bend your knees and hips and keep your back straight when you lift a heavy object.   Avoid lifting heavy objects higher than your waist.   Hold packages you carry close to your body, with your arms bent.   Use a footrest for one foot when you stand or sit in one spot for a long time. This keeps your back straight.   Bend your knees when you bend over.   Sit close to the pedals when you drive and use your seat belt and a hard backrest or pillow.   Lie on your side with your knees bent when you sleep or rest. It may help to put a pillow between your knees.   Put a pillow under your knees when you sleep on your back.   Raise the foot of the bed 8 inches to discourage sleeping on your stomach unless you have other problems that require that you keep your head elevated.   To rest your back, hold each of these positions for 5?minutes or longer:   Lie on your back, bend your knees, and put pillows under your knees.   Lie on your back on the floor with a pillow under  your neck. Bend your knees to a 90-degree angle, and put your lower legs and feet on a chair.   Lie on your back, bend your knees, and bring one knee up to your chest and hold it there. Repeat with the other knee, then bring both knees to your chest. When holding your knee to your chest, grab your thigh rather than your lower leg to avoid over flexing your knee.     Published by [employer].  This content is reviewed periodically and is subject to change as new health information becomes available. The information is intended to inform and educate and is not a replacement for medical evaluation, advice, diagnosis or treatment by a healthcare professional.   Developed by Renetta Carvajal RN, [place], and [employer] [facility].   ? 2010 [facility] and/or its affiliates. All Rights Reserved.           Low Back Pain Exercise          Standing hamstring stretch: Put the heel of one leg on a stool about 15 inches high. Keep your leg straight. Lean forward, bending at the hips until you feel a mild stretch in the back of your thigh. Make sure you do not roll your shoulders or bend at the waist when doing this. You want to stretch your leg, not your lower back. Hold the stretch for 15 to 30 seconds. Repeat with each leg 3 times.   Cat and camel: Get down on your hands and knees. Let your stomach sag, allowing your back to curve downward. Hold this position for 5 seconds. Then arch your back and hold for 5 seconds. Do 3 sets of 10.   Quadruped arm and leg raise: Get down on your hands and knees. Pull in your belly button and tighten your abdominal muscles to stiffen your spine. While keeping your abdominals tight, raise one arm and the opposite leg away from you. Hold this position for 5 seconds. Lower your arm and leg slowly and change sides. Do this 10 times on each side.   Pelvic tilt: Lie on your back with your knees bent and your feet flat on the floor. Tighten your abdominal muscles and push your lower back into the floor. Hold  this position for 5 seconds, then relax. Do 3 sets of 10.   Partial curl: Lie on your back with your knees bent and your feet flat on the floor. Tighten your stomach muscles. Tuck your chin to your chest. With your hands stretched out in front of you, curl your upper body forward until your shoulders clear the floor. Hold this position for 3 seconds. Don't hold your breath. It helps to breathe out as you lift your shoulders up. Relax back to the floor. Repeat 10 times. Build to 3 sets of 10. To challenge yourself, clasp your hands behind your head and keep your elbows out to the side.   Gluteal stretch: Lie on your back with both knees bent. Rest the ankle of one leg over the knee of your other leg. Grasp the thigh of the bottom leg and pull toward your chest. You will feel a stretch along the buttocks and possibly along the outside of your hip. Hold the stretch for 15 to 30 seconds. Repeat 3 times with each leg.   Extension exercise:   0. Lie face down on the floor for 5 minutes. If this hurts too much, lie face down with a pillow under your stomach. This should relieve your leg or back pain. When you can lie on your stomach for 5 minutes without a pillow, you can continue with Part B of this exercise.   0. After lying on your stomach for 5 minutes, prop yourself up on your elbows for another 5 minutes. If you can do this without having more leg or buttock pain, you can start doing part C of this exercise.   0. Lie on your stomach with your hands under your shoulders. Then press down on your hands and extend your elbows while keeping your hips flat on the floor. Hold for 1 second and lower yourself to the floor. Do 3 to 5 sets of 10 repetitions. Rest for 1 minute between sets. You should have no pain in your legs when you do this, but it is normal to feel some pain in your lower back.   Do this exercise several times a day.   Side plank: Lie on your side with your legs, hips, and shoulders in a straight line. Prop  yourself up onto your forearm so your elbow is directly under your shoulder. Lift your hips off the floor and balance on your forearm and the outside of your foot. Try to hold this position for 15 seconds, then slowly lower your hip to the ground. Switch sides and repeat. Work up to holding for 1 minute or longer. This exercise can be made easier by starting with your knees and hips flexed toward your chest.   Published by [employer].  This content is reviewed periodically and is subject to change as new health information becomes available. The information is intended to inform and educate and is not a replacement for medical evaluation, advice, diagnosis or treatment by a healthcare professional.   Written by Ivana Bolton, MS, PT, and Renetta Ramachandran PT, [facility], [facility], for [facility]   ? 2010 [facility] and/or its affiliates. All Rights Reserved.         Copyright   [employer] 2011
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
## 2017-09-21 ENCOUNTER — MYC MEDICAL ADVICE (OUTPATIENT)
Dept: FAMILY MEDICINE | Facility: OTHER | Age: 32
End: 2017-09-21

## 2017-09-25 ENCOUNTER — OFFICE VISIT (OUTPATIENT)
Dept: FAMILY MEDICINE | Facility: OTHER | Age: 32
End: 2017-09-25
Payer: COMMERCIAL

## 2017-09-25 VITALS
BODY MASS INDEX: 36.15 KG/M2 | RESPIRATION RATE: 16 BRPM | OXYGEN SATURATION: 98 % | HEART RATE: 82 BPM | DIASTOLIC BLOOD PRESSURE: 76 MMHG | TEMPERATURE: 98.1 F | HEIGHT: 71 IN | WEIGHT: 258.2 LBS | SYSTOLIC BLOOD PRESSURE: 124 MMHG

## 2017-09-25 DIAGNOSIS — G89.29 CHRONIC MIDLINE THORACIC BACK PAIN: ICD-10-CM

## 2017-09-25 DIAGNOSIS — E66.09 NON MORBID OBESITY DUE TO EXCESS CALORIES: ICD-10-CM

## 2017-09-25 DIAGNOSIS — M54.6 CHRONIC MIDLINE THORACIC BACK PAIN: ICD-10-CM

## 2017-09-25 DIAGNOSIS — M54.6 ACUTE MIDLINE THORACIC BACK PAIN: Primary | ICD-10-CM

## 2017-09-25 PROCEDURE — 99213 OFFICE O/P EST LOW 20 MIN: CPT | Performed by: STUDENT IN AN ORGANIZED HEALTH CARE EDUCATION/TRAINING PROGRAM

## 2017-09-25 RX ORDER — OXYCODONE AND ACETAMINOPHEN 5; 325 MG/1; MG/1
1-2 TABLET ORAL EVERY 6 HOURS PRN
Qty: 30 TABLET | Refills: 0 | Status: SHIPPED | OUTPATIENT
Start: 2017-09-25 | End: 2017-09-29

## 2017-09-25 RX ORDER — METHOCARBAMOL 750 MG/1
750 TABLET, FILM COATED ORAL 4 TIMES DAILY PRN
Qty: 45 TABLET | Refills: 0 | Status: SHIPPED | OUTPATIENT
Start: 2017-09-25 | End: 2017-10-11

## 2017-09-25 ASSESSMENT — PAIN SCALES - GENERAL: PAINLEVEL: SEVERE PAIN (7)

## 2017-09-25 NOTE — NURSING NOTE
"Chief Complaint   Patient presents with     Back Pain       Initial /76 (BP Location: Right arm, Patient Position: Sitting, Cuff Size: Adult Large)  Pulse 82  Temp 98.1  F (36.7  C) (Oral)  Resp 16  Ht 5' 11.18\" (1.808 m)  Wt 258 lb 3.2 oz (117.1 kg)  SpO2 98%  BMI 35.83 kg/m2 Estimated body mass index is 35.83 kg/(m^2) as calculated from the following:    Height as of this encounter: 5' 11.18\" (1.808 m).    Weight as of this encounter: 258 lb 3.2 oz (117.1 kg).  Medication Reconciliation: complete   Consuelo Chappell CMA      "

## 2017-09-25 NOTE — LETTER
[address]   [facility] [address]  Phone: [phone]      September 25, 2017      RE: Moris Bowers  [id number]  [facility] [address]        To whom it may concern:    Moris Bowers is under my professional care. The employee is ABLE to return to work today.    When the patient returns to work, the following restrictions apply until 9/25/17:  A) Bend: Not at all (0 hours)  B) Squat: Not at all (0 hours)  C) Walk/Stand: Occasionally (1-3 hours)  D) Reach Above Shoulders: Not at all (0 hours)  E) Lift, carry, push, and pull no more than:  0-10 lbs.       Sincerely,              Sienna Rizzo, LILIANA-C

## 2017-09-25 NOTE — MR AVS SNAPSHOT
After Visit Summary   9/25/2017    Moris FARIA [place]    MRN: [id number]           Patient Information     Date Of Birth          1985        Visit Information        Provider Department      9/25/2017 1:10 PM Sienna Rizzo APRN [facility]        Today's Diagnoses     Chronic midline thoracic back pain    -  1    Acute midline thoracic back pain          Care Instructions    Switch to Robaxin for muscle relaxer.    MRI of the middle of the back.    Sienna Rizzo NP-C            Follow-ups after your visit        Your next 10 appointments already scheduled     Sep 27, 2017  5:00 PM CDT   MR THORACIC SPINE W/O CONTRAST with PHMR1   [facility] ([facility] [place])    [address] [place] [address] [phone]           Take your medicines as usual, unless your doctor tells you not to. Bring a list of your current medicines to your exam (including vitamins, minerals and over-the-counter drugs). Also bring the results of similar scans you may have had.  Please remove any body piercings and hair extensions before you arrive.  Follow your doctor s orders. If you do not, we may have to postpone your exam.  You will not have contrast for this exam. You do not need to do anything special to prepare.  The MRI machine uses a strong magnet. Please wear clothes without metal (snaps, zippers). A sweatsuit works well, or we may give you a hospital gown.   **IMPORTANT** THE INSTRUCTIONS BELOW ARE ONLY FOR THOSE PATIENTS WHO HAVE BEEN TOLD THEY WILL RECEIVE SEDATION OR GENERAL ANESTHESIA DURING THEIR MRI PROCEDURE:  IF YOU WILL RECEIVE SEDATION (take medicine to help you relax during your exam):   You must get the medicine from your doctor before you arrive. Bring the medicine to the exam. Do not take it at home.   Arrive one hour early. Bring someone who can take you home after the test. Your medicine will make you sleepy. After the  exam, you may not drive, take a bus or take a taxi by yourself.   No eating 8 hours before your exam. You may have clear liquids up until 4 hours before your exam. (Clear liquids include water, clear tea, black coffee and fruit juice without pulp.)  IF YOU WILL RECEIVE ANESTHESIA (be asleep for your exam):   Arrive 1 1/2 hours early. Bring someone who can take you home after the test. You may not drive, take a bus or take a taxi by yourself.   No eating 8 hours before your exam. You may have clear liquids up until 4 hours before your exam. (Clear liquids include water, clear tea, black coffee and fruit juice without pulp.)   You will spend four to five hours in the recovery room.  Please call the Imaging Department at your exam site with any questions.              Future tests that were ordered for you today     Open Future Orders        Priority Expected Expires Ordered    MR Thoracic Spine w/o Contrast Routine  9/25/2018 9/25/2017            Who to contact     If you have questions or need follow up information about today's clinic visit or your schedule please contact [facility] directly at [phone].  Normal or non-critical lab and imaging results will be communicated to you by Partigihart, letter or phone within 4 business days after the clinic has received the results. If you do not hear from us within 7 days, please contact the clinic through [employer] or phone. If you have a critical or abnormal lab result, we will notify you by phone as soon as possible.  Submit refill requests through [employer] or call your pharmacy and they will forward the refill request to us. Please allow 3 business days for your refill to be completed.          Additional Information About Your Visit        [id number] Information     [employer] gives you secure access to your electronic health record. If you see a primary care provider, you can also send messages to your care team and make appointments. If you have questions,  "please call your primary care clinic.  If you do not have a primary care provider, please call [phone] and they will assist you.        Care EveryWhere ID     This is your Care EveryWhere ID. This could be used by other organizations to access your [place] medical records  [id number]        Your Vitals Were     Pulse Temperature Respirations Height Pulse Oximetry BMI (Body Mass Index)    82 98.1  F (36.7  C) (Oral) 16 5' 11.18\" (1.808 m) 98% 35.83 kg/m2       Blood Pressure from Last 3 Encounters:   09/25/17 124/76   09/20/17 132/90   05/26/17 134/88    Weight from Last 3 Encounters:   09/25/17 258 lb 3.2 oz (117.1 kg)   09/20/17 261 lb 3.2 oz (118.5 kg)   05/26/17 262 lb (118.8 kg)                 Today's Medication Changes          These changes are accurate as of: 9/25/17  2:07 PM.  If you have any questions, ask your nurse or doctor.               Start taking these medicines.        Dose/Directions    methocarbamol 750 MG tablet   Commonly known as:  ROBAXIN   Used for:  Acute midline thoracic back pain   Started by:  Sienna Rizzo APRN CNP        Dose:  750 mg   Take 1 tablet (750 mg) by mouth 4 times daily as needed for muscle spasms   Quantity:  45 tablet   Refills:  0         Stop taking these medicines if you haven't already. Please contact your care team if you have questions.     cyclobenzaprine 10 MG tablet   Commonly known as:  FLEXERIL   Stopped by:  Sienna Rizzo APRN CNP                Where to get your medicines      These medications were sent to [id number] - [place], [place] - [address] [address]  [address] [facility] [address]     Phone:  [phone]     methocarbamol 750 MG tablet         Some of these will need a paper prescription and others can be bought over the counter.  Ask your nurse if you have questions.     Bring a paper prescription for each of these medications     oxyCODONE-acetaminophen 5-325 MG per tablet                Primary " Care Provider Office Phone # Fax #    Yaritza Castorena [phone]       [address] [place] [address]        Equal Access to Services     FREDERIC HOBBS : Hadii aad ku hadmariemichaela Rice, wasoteroda aaronedha, qalosta kajyoti lovelace, marcelino martinezstorm brunerolamide corrales venu escobar. So [facility] [phone].    ATENCIÓN: Si habla español, tiene a martinez disposición servicios gratuitos de asistencia lingüística. Llame al [phone].    We comply with applicable federal civil rights laws and [place] laws. We do not discriminate on the basis of race, color, national origin, age, disability sex, sexual orientation or gender identity.            Thank you!     Thank you for choosing [facility]  for your care. Our goal is always to provide you with excellent care. Hearing back from our patients is one way we can continue to improve our services. Please take a few minutes to complete the written survey that you may receive in the mail after your visit with us. Thank you!             Your Updated Medication List - Protect others around you: Learn how to safely use, store and throw away your medicines at www.disposemymeds.org.          This list is accurate as of: 9/25/17  2:07 PM.  Always use your most recent med list.                   Brand Name Dispense Instructions for use Diagnosis    aspirin-acetaminophen-caffeine 250-250-65 MG per tablet    EXCEDRIN MIGRAINE     Take 1 tablet by mouth every 6 hours as needed for headaches Reported on 5/18/2017        ibuprofen 200 MG tablet    ADVIL/MOTRIN    1 tablet    Take 4 tablets (800 mg) by mouth every 8 hours as needed for mild pain    Bilateral low back pain, with sciatica presence unspecified       methocarbamol 750 MG tablet    ROBAXIN    45 tablet    Take 1 tablet (750 mg) by mouth 4 times daily as needed for muscle spasms    Acute midline thoracic back pain       oxyCODONE-acetaminophen 5-325 MG per tablet    PERCOCET    30 tablet    Take 1-2  tablets by mouth every 6 hours as needed for pain Maximum 8 tablet(s) per day. Use sparingly.    Acute midline thoracic back pain
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
## 2017-09-25 NOTE — LETTER
[facility]  [address]   [place] [address]  Phone: [phone]    [facility]  [address]  [place] [address]  Phone: [phone]        September 25, 2017        RE: Moris Bowers  [address]  [place] [address]           To whom it may concern:     Moris Bowers is under my professional care. The employee is ABLE to return to work today.     When the patient returns to work, the following restrictions apply until 10/9/17:  A) Bend: Not at all (0 hours)  B) Squat: Not at all (0 hours)  C) Walk/Stand: Occasionally (1-3 hours)  D) Reach Above Shoulders: Not at all (0 hours)  E) Lift, carry, push, and pull no more than:  0-10 lbs.         Sincerely,                    Sienna Rizzo, LILIANA-C

## 2017-09-27 ENCOUNTER — HOSPITAL ENCOUNTER (OUTPATIENT)
Dept: MRI IMAGING | Facility: CLINIC | Age: 32
Discharge: HOME | End: 2017-09-27
Attending: STUDENT IN AN ORGANIZED HEALTH CARE EDUCATION/TRAINING PROGRAM | Admitting: STUDENT IN AN ORGANIZED HEALTH CARE EDUCATION/TRAINING PROGRAM
Payer: COMMERCIAL

## 2017-09-27 DIAGNOSIS — M54.6 CHRONIC MIDLINE THORACIC BACK PAIN: ICD-10-CM

## 2017-09-27 DIAGNOSIS — M54.6 ACUTE MIDLINE THORACIC BACK PAIN: ICD-10-CM

## 2017-09-27 DIAGNOSIS — G89.29 CHRONIC MIDLINE THORACIC BACK PAIN: ICD-10-CM

## 2017-09-27 PROCEDURE — 72146 MRI CHEST SPINE W/O DYE: CPT

## 2017-09-28 ENCOUNTER — MYC MEDICAL ADVICE (OUTPATIENT)
Dept: FAMILY MEDICINE | Facility: OTHER | Age: 32
End: 2017-09-28

## 2017-09-28 DIAGNOSIS — M54.6 ACUTE MIDLINE THORACIC BACK PAIN: ICD-10-CM

## 2017-09-28 NOTE — TELEPHONE ENCOUNTER
Informed patient via [employer] that EM is not in clinic today. Provider to review MRI results and advise. Ibeth Caal RN, BSN

## 2017-09-29 RX ORDER — OXYCODONE AND ACETAMINOPHEN 5; 325 MG/1; MG/1
2 TABLET ORAL EVERY 6 HOURS PRN
Qty: 60 TABLET | Refills: 0 | Status: SHIPPED | OUTPATIENT
Start: 2017-09-29 | End: 2017-10-11

## 2017-09-29 NOTE — TELEPHONE ENCOUNTER
Rx for Percocet in [place] task. Please put at .  Patient knows prescription will be placed there so doesn't need call.    Talked to patient and discussed MRI results. Called to talked to Dr. Byers or Ernestina Estrada but they are not in so we will work on getting patient in with Dr. Byers.    Sienna Rizzo, LILIANA-C

## 2017-09-29 NOTE — TELEPHONE ENCOUNTER
Rx placed at .    Patient has an appointment scheduled with Christo Alston on 10/06/2017 at 9:10 AM at the [facility].     EM is calling patient, so will wait to inform.  Shaye Delarosa CMA

## 2017-09-29 NOTE — TELEPHONE ENCOUNTER
Please send [employer] appointment message for appointment with Fran Alston on 10/6.   I called and talked to patient to let him know about the appointment. Also talked to Fran Alston and he felt that based on the MRI there was no reason to have patient seen earlier than the 6th by neurosurgery.

## 2017-10-06 ENCOUNTER — TELEPHONE (OUTPATIENT)
Dept: PALLIATIVE MEDICINE | Facility: CLINIC | Age: 32
End: 2017-10-06

## 2017-10-06 ENCOUNTER — MYC MEDICAL ADVICE (OUTPATIENT)
Dept: FAMILY MEDICINE | Facility: OTHER | Age: 32
End: 2017-10-06

## 2017-10-06 ENCOUNTER — OFFICE VISIT (OUTPATIENT)
Dept: NEUROSURGERY | Facility: CLINIC | Age: 32
End: 2017-10-06
Payer: COMMERCIAL

## 2017-10-06 VITALS — BODY MASS INDEX: 35.76 KG/M2 | HEIGHT: 71 IN | TEMPERATURE: 96.6 F | WEIGHT: 255.4 LBS

## 2017-10-06 DIAGNOSIS — M54.6 THORACIC SPINE PAIN: Primary | ICD-10-CM

## 2017-10-06 PROCEDURE — 99204 OFFICE O/P NEW MOD 45 MIN: CPT | Performed by: PHYSICIAN ASSISTANT

## 2017-10-06 ASSESSMENT — PAIN SCALES - GENERAL: PAINLEVEL: EXTREME PAIN (8)
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
## 2017-10-06 NOTE — MR AVS SNAPSHOT
After Visit Summary   10/6/2017    Fabianedgarpadmini Bowers    MRN: [id number]           Patient Information     Date Of Birth          1985        Visit Information        Provider Department      10/6/2017 9:10 AM Christo Alston PA-C [facility]        Today's Diagnoses     Thoracic spine pain    -  1       Follow-ups after your visit        Additional Services     PAIN MANAGEMENT REFERRAL       Your provider has referred you to: [facility] [place], [place]: [facility] -    Reason for Referral: Comprehensive Evaluation and Management    Please complete the following questions:    What is your diagnosis for the patient's pain? Thoracic pain    Do you have any specific questions for the pain specialist? No    Are there any red flags that may impact the assessment or management of the patient? None    For any questions, contact the [facility] at [phone].     **ANY DIAGNOSTIC TESTS THAT ARE NOT IN EPIC SHOULD BE SENT TO THE PAIN CENTER**    REGARDING OPIOID MEDICATIONS:  We will always address appropriateness of opioid pain medications, but we generally will not automatically take on a prescribing role. When we do take on prescribing of opioids for chronic pain, it is in collaboration with the referring physician for an intermediate period of time (months), with an expectation that the primary physician or provider will assume the prescribing role if medications are effective at stable doses with demonstrated compliance.  Therefore, please do not assume that your prescribing responsibilities end on the day of pain clinic consultation.  Is this agreeable to you? YES    Please be aware that coverage of these services is subject to the terms and limitations of your health insurance plan.  Call member services at your health plan with any benefit or coverage questions.      Please bring the following with you to your appointment:    (1) Any X-Rays, CTs or MRIs which  have been performed.  Contact the facility where they were done to arrange for  prior to your scheduled appointment.    (2) List of current medications   (3) This referral request   (4) Any documents/labs given to you for this referral                  Follow-up notes from your care team     Return if symptoms worsen or fail to improve.      Your next 10 appointments already scheduled     Oct 10, 2017  9:00 AM CDT   New Visit with HALEIGH Boss CNP   [facility] [place] ([facility])    [address]  [place] [address] [phone]              Who to contact     If you have questions or need follow up information about today's clinic visit or your schedule please contact [facility] directly at [phone].  Normal or non-critical lab and imaging results will be communicated to you by MyChart, letter or phone within 4 business days after the clinic has received the results. If you do not hear from us within 7 days, please contact the clinic through MyChart or phone. If you have a critical or abnormal lab result, we will notify you by phone as soon as possible.  Submit refill requests through [employer] or call your pharmacy and they will forward the refill request to us. Please allow 3 business days for your refill to be completed.          Additional Information About Your Visit        [employer] Information     [employer] gives you secure access to your electronic health record. If you see a primary care provider, you can also send messages to your care team and make appointments. If you have questions, please call your primary care clinic.  If you do not have a primary care provider, please call [phone] and they will assist you.        Care EveryWhere ID     This is your Care EveryWhere ID. This could be used by other organizations to access your [place] medical records  [id number]        Your Vitals Were     Temperature Height BMI (Body  "Mass Index)             96.6  F (35.9  C) (Temporal) 1.808 m (5' 11.18\") 35.44 kg/m2          Blood Pressure from Last 3 Encounters:   09/25/17 124/76   09/20/17 132/90   05/26/17 134/88    Weight from Last 3 Encounters:   10/06/17 115.8 kg (255 lb 6.4 oz)   09/25/17 117.1 kg (258 lb 3.2 oz)   09/20/17 118.5 kg (261 lb 3.2 oz)              We Performed the Following     PAIN MANAGEMENT REFERRAL        Primary Care Provider Office Phone # Fax #    Yaritza Savita Castorena [phone]       [address] [place] [address]        Equal Access to Services     FREDERIC HOBBS : Haritha corleyo Sojohn, waaxda luqadaha, qaybta kaalmada adeegyaalonso, marcelino lea . So [facility] [phone].    ATENCIÓN: Si habla español, tiene a martinez disposición servicios gratuitos de asistencia lingüística. LlCleveland Clinic Foundation [phone].    We comply with applicable federal civil rights laws and [place] laws. We do not discriminate on the basis of race, color, national origin, age, disability, sex, sexual orientation, or gender identity.            Thank you!     Thank you for choosing [facility]  for your care. Our goal is always to provide you with excellent care. Hearing back from our patients is one way we can continue to improve our services. Please take a few minutes to complete the written survey that you may receive in the mail after your visit with us. Thank you!             Your Updated Medication List - Protect others around you: Learn how to safely use, store and throw away your medicines at www.disposemymeds.org.          This list is accurate as of: 10/6/17  2:59 PM.  Always use your most recent med list.                   Brand Name Dispense Instructions for use Diagnosis    aspirin-acetaminophen-caffeine 250-250-65 MG per tablet    EXCEDRIN MIGRAINE     Take 1 tablet by mouth every 6 hours as needed for headaches Reported on 5/18/2017        ibuprofen 200 MG tablet    " ADVIL/MOTRIN    1 tablet    Take 4 tablets (800 mg) by mouth every 8 hours as needed for mild pain    Bilateral low back pain, with sciatica presence unspecified       methocarbamol 750 MG tablet    ROBAXIN    45 tablet    Take 1 tablet (750 mg) by mouth 4 times daily as needed for muscle spasms    Acute midline thoracic back pain       oxyCODONE-acetaminophen 5-325 MG per tablet    PERCOCET    60 tablet    Take 2 tablets by mouth every 6 hours as needed for pain Maximum 8 tablet(s) per day. Use sparingly.    Acute midline thoracic back pain

## 2017-10-06 NOTE — TELEPHONE ENCOUNTER
Unsure if covering provider has more options for patient in regards to back pain. Has seem EM x 2 and Dr. Alston today.     Beulah Wagoner, RN, BSN

## 2017-10-06 NOTE — TELEPHONE ENCOUNTER
Pain Management Center Referral      1. Confirmed address with patient? Yes  2. Confirmed phone number with patient? Yes  3. Confirmed referring provider? Yes  4. Is the PCP the same as the referring provider? No  5. Has the patient been to any previous pain clinics? Yes  (If yes, send GIO with welcome letter)  6. Which insurance are we to bill for this appointment?  PO    7. Informed pt of cancellation (48 hour) policy? Yes    REGARDING OPIOID MEDICATIONS: We will always address appropriateness of opioid pain medications, but we generally will not automatically take on a prescribing role. When we do take on prescribing of opioids for chronic pain, it is in collaboration with the referring physician for an intermediate period of time (months), with an expectation that the primary physician or provider will assume the prescribing role if medications are effective at stable doses with demonstrated compliance. Therefore, please do not assume that your prescribing responsibilities end on the day of pain clinic consultation.  7. Informed pt of prescribing policy? Yes      8. Referring Provider: Christo Alston PA-C

## 2017-10-06 NOTE — PROGRESS NOTES
"Moris Bowers is a 31 year old male who presents for:  Chief Complaint   Patient presents with     Neurologic Problem     thoracic back pain, Ref: Marilou        Initial Vitals:  Temp 96.6  F (35.9  C) (Temporal)  Ht 1.808 m (5' 11.18\")  Wt 115.8 kg (255 lb 6.4 oz)  BMI 35.44 kg/m2 Estimated body mass index is 35.44 kg/(m^2) as calculated from the following:    Height as of this encounter: 1.808 m (5' 11.18\").    Weight as of this encounter: 115.8 kg (255 lb 6.4 oz).. Body surface area is 2.41 meters squared. BP completed using cuff size: NA (Not Taken)  Extreme Pain (8)    Do you feel safe in your environment?  Yes  Do you need any refills today? No    Nursing Comments:         Sofie Saunders CMA     "

## 2017-10-09 NOTE — TELEPHONE ENCOUNTER
[facility] message has been read, with no response.  Will flag for EM.    Curtis Sterling RN, BSN

## 2017-10-11 ENCOUNTER — OFFICE VISIT (OUTPATIENT)
Dept: FAMILY MEDICINE | Facility: CLINIC | Age: 32
End: 2017-10-11
Payer: COMMERCIAL

## 2017-10-11 ENCOUNTER — RADIANT APPOINTMENT (OUTPATIENT)
Dept: GENERAL RADIOLOGY | Facility: CLINIC | Age: 32
End: 2017-10-11
Attending: NURSE PRACTITIONER
Payer: COMMERCIAL

## 2017-10-11 VITALS
BODY MASS INDEX: 35.14 KG/M2 | HEART RATE: 82 BPM | SYSTOLIC BLOOD PRESSURE: 114 MMHG | WEIGHT: 253.2 LBS | OXYGEN SATURATION: 99 % | DIASTOLIC BLOOD PRESSURE: 82 MMHG | TEMPERATURE: 98.8 F | RESPIRATION RATE: 12 BRPM

## 2017-10-11 DIAGNOSIS — R10.84 ABDOMINAL PAIN, GENERALIZED: ICD-10-CM

## 2017-10-11 DIAGNOSIS — K92.1 BLOOD IN STOOL: ICD-10-CM

## 2017-10-11 DIAGNOSIS — R68.83 CHILLS: Primary | ICD-10-CM

## 2017-10-11 LAB
ALBUMIN SERPL-MCNC: 3.9 G/DL (ref 3.4–5)
ALP SERPL-CCNC: 64 U/L (ref 40–150)
ALT SERPL W P-5'-P-CCNC: 60 U/L (ref 0–70)
AMYLASE SERPL-CCNC: 30 U/L (ref 30–110)
ANION GAP SERPL CALCULATED.3IONS-SCNC: 6 MMOL/L (ref 3–14)
AST SERPL W P-5'-P-CCNC: 20 U/L (ref 0–45)
BILIRUB SERPL-MCNC: 0.6 MG/DL (ref 0.2–1.3)
BUN SERPL-MCNC: 11 MG/DL (ref 7–30)
CALCIUM SERPL-MCNC: 8.8 MG/DL (ref 8.5–10.1)
CHLORIDE SERPL-SCNC: 111 MMOL/L (ref 94–109)
CO2 SERPL-SCNC: 24 MMOL/L (ref 20–32)
CREAT SERPL-MCNC: 0.75 MG/DL (ref 0.66–1.25)
ERYTHROCYTE [DISTWIDTH] IN BLOOD BY AUTOMATED COUNT: 13.8 % (ref 10–15)
GFR SERPL CREATININE-BSD FRML MDRD: >90 ML/MIN/1.7M2
GLUCOSE SERPL-MCNC: 97 MG/DL (ref 70–99)
HCT VFR BLD AUTO: 46.2 % (ref 40–53)
HGB BLD-MCNC: 16 G/DL (ref 13.3–17.7)
LIPASE SERPL-CCNC: 136 U/L (ref 73–393)
MCH RBC QN AUTO: 29 PG (ref 26.5–33)
MCHC RBC AUTO-ENTMCNC: 34.6 G/DL (ref 31.5–36.5)
MCV RBC AUTO: 84 FL (ref 78–100)
PLATELET # BLD AUTO: 254 10E9/L (ref 150–450)
POTASSIUM SERPL-SCNC: 3.9 MMOL/L (ref 3.4–5.3)
PROT SERPL-MCNC: 7.3 G/DL (ref 6.8–8.8)
RBC # BLD AUTO: 5.52 10E12/L (ref 4.4–5.9)
SODIUM SERPL-SCNC: 141 MMOL/L (ref 133–144)
T4 FREE SERPL-MCNC: 0.93 NG/DL (ref 0.76–1.46)
TSH SERPL DL<=0.005 MIU/L-ACNC: 0.29 MU/L (ref 0.4–4)
WBC # BLD AUTO: 11.2 10E9/L (ref 4–11)

## 2017-10-11 PROCEDURE — 36415 COLL VENOUS BLD VENIPUNCTURE: CPT | Performed by: NURSE PRACTITIONER

## 2017-10-11 PROCEDURE — 85027 COMPLETE CBC AUTOMATED: CPT | Performed by: NURSE PRACTITIONER

## 2017-10-11 PROCEDURE — 99214 OFFICE O/P EST MOD 30 MIN: CPT | Performed by: NURSE PRACTITIONER

## 2017-10-11 PROCEDURE — 74020 XR ABDOMEN 2 VW: CPT

## 2017-10-11 PROCEDURE — 84443 ASSAY THYROID STIM HORMONE: CPT | Performed by: NURSE PRACTITIONER

## 2017-10-11 PROCEDURE — 84439 ASSAY OF FREE THYROXINE: CPT | Performed by: NURSE PRACTITIONER

## 2017-10-11 PROCEDURE — 82150 ASSAY OF AMYLASE: CPT | Performed by: NURSE PRACTITIONER

## 2017-10-11 PROCEDURE — 83690 ASSAY OF LIPASE: CPT | Performed by: NURSE PRACTITIONER

## 2017-10-11 PROCEDURE — 80053 COMPREHEN METABOLIC PANEL: CPT | Performed by: NURSE PRACTITIONER

## 2017-10-11 RX ORDER — CYCLOBENZAPRINE HCL 10 MG
TABLET ORAL
Refills: 0 | COMMUNITY
Start: 2017-09-20 | End: 2018-02-19 | Stop reason: SINTOL

## 2017-10-11 RX ORDER — AZITHROMYCIN 250 MG/1
TABLET, FILM COATED ORAL
Qty: 6 TABLET | Refills: 0 | Status: SHIPPED | OUTPATIENT
Start: 2017-10-11 | End: 2017-12-22

## 2017-10-11 ASSESSMENT — ANXIETY QUESTIONNAIRES
GAD7 TOTAL SCORE: 6
2. NOT BEING ABLE TO STOP OR CONTROL WORRYING: SEVERAL DAYS
7. FEELING AFRAID AS IF SOMETHING AWFUL MIGHT HAPPEN: SEVERAL DAYS
1. FEELING NERVOUS, ANXIOUS, OR ON EDGE: SEVERAL DAYS
3. WORRYING TOO MUCH ABOUT DIFFERENT THINGS: NOT AT ALL
IF YOU CHECKED OFF ANY PROBLEMS ON THIS QUESTIONNAIRE, HOW DIFFICULT HAVE THESE PROBLEMS MADE IT FOR YOU TO DO YOUR WORK, TAKE CARE OF THINGS AT HOME, OR GET ALONG WITH OTHER PEOPLE: NOT DIFFICULT AT ALL
5. BEING SO RESTLESS THAT IT IS HARD TO SIT STILL: SEVERAL DAYS
6. BECOMING EASILY ANNOYED OR IRRITABLE: SEVERAL DAYS

## 2017-10-11 ASSESSMENT — PATIENT HEALTH QUESTIONNAIRE - PHQ9
5. POOR APPETITE OR OVEREATING: SEVERAL DAYS
SUM OF ALL RESPONSES TO PHQ QUESTIONS 1-9: 7

## 2017-10-11 NOTE — NURSING NOTE
"Chief Complaint   Patient presents with     Rectal Problem       Initial /82 (BP Location: Right arm, Patient Position: Sitting, Cuff Size: Adult Large)  Pulse 82  Temp 98.8  F (37.1  C) (Oral)  Resp 12  Wt 114.9 kg (253 lb 3.2 oz)  SpO2 99%  BMI 35.14 kg/m2 Estimated body mass index is 35.14 kg/(m^2) as calculated from the following:    Height as of 10/6/17: 1.808 m (5' 11.18\").    Weight as of this encounter: 114.9 kg (253 lb 3.2 oz).  Medication Reconciliation: complete     Afsaneh Brush        "

## 2017-10-11 NOTE — MR AVS SNAPSHOT
After Visit Summary   10/11/2017    Fabianmariya GIANA Trona    MRN: [id number]           Patient Information     Date Of Birth          1985        Visit Information        Provider Department      10/11/2017 7:00 AM Sachi Stubbs NP [facility]        Today's Diagnoses     Chills    -  1    Abdominal pain, generalized        Blood in stool           Follow-ups after your visit        Future tests that were ordered for you today     Open Future Orders        Priority Expected Expires Ordered    Fecal colorectal cancer screen (FIT) Routine 11/1/2017 1/3/2018 10/11/2017            Who to contact     If you have questions or need follow up information about today's clinic visit or your schedule please contact [facility] directly at [phone].  Normal or non-critical lab and imaging results will be communicated to you by [employer], letter or phone within 4 business days after the clinic has received the results. If you do not hear from us within 7 days, please contact the clinic through [employer] or phone. If you have a critical or abnormal lab result, we will notify you by phone as soon as possible.  Submit refill requests through [employer] or call your pharmacy and they will forward the refill request to us. Please allow 3 business days for your refill to be completed.          Additional Information About Your Visit        MyChart Information     [employer] gives you secure access to your electronic health record. If you see a primary care provider, you can also send messages to your care team and make appointments. If you have questions, please call your primary care clinic.  If you do not have a primary care provider, please call [phone] and they will assist you.        Care EveryWhere ID     This is your Care EveryWhere ID. This could be used by other organizations to access your [facility] medical records  [id number]        Your Vitals Were     Pulse Temperature  Respirations Pulse Oximetry BMI (Body Mass Index)       82 98.8  F (37.1  C) (Oral) 12 99% 35.14 kg/m2        Blood Pressure from Last 3 Encounters:   10/11/17 114/82   09/25/17 124/76   09/20/17 132/90    Weight from Last 3 Encounters:   10/11/17 114.9 kg (253 lb 3.2 oz)   10/06/17 115.8 kg (255 lb 6.4 oz)   09/25/17 117.1 kg (258 lb 3.2 oz)              We Performed the Following     Amylase     CBC with platelets     Comprehensive metabolic panel (BMP + Alb, Alk Phos, ALT, AST, Total. Bili, TP)     Lipase     XR Abdomen 2 Views          Today's Medication Changes          These changes are accurate as of: 10/11/17  8:17 AM.  If you have any questions, ask your nurse or doctor.               Start taking these medicines.        Dose/Directions    azithromycin 250 MG tablet   Commonly known as:  ZITHROMAX   Used for:  Chills   Started by:  Sachi Stubbs, LILIANA        Two tablets first day, then one tablet daily for four days.   Quantity:  6 tablet   Refills:  0            Where to get your medicines      These medications were sent to [id number] [address] [address] [place]  [id number] [place] [address]     Phone:  [phone]     azithromycin 250 MG tablet                Primary Care Provider Office Phone # Fax #    Yaritza Castorena [phone]       [address] [place] [address]        Equal Access to Services     FREDERIC HOBBS AH: Hadii aad ku hadasho Sojohn, waaxda luqadaha, qaybta kaalmada adeolamideyaalonso, waxay baldev layton adeolamide escobar. So [facility] [phone].    ATENCIÓN: Si habla español, tiene a martinez disposición servicios gratuitos de asistencia lingüística. Isadora al [phone].    We comply with applicable federal civil rights laws and [place] laws. We do not discriminate on the basis of race, color, national origin, age, disability, sex, sexual orientation, or gender identity.            Thank you!     Thank you for choosing [facility]  for  your care. Our goal is always to provide you with excellent care. Hearing back from our patients is one way we can continue to improve our services. Please take a few minutes to complete the written survey that you may receive in the mail after your visit with us. Thank you!             Your Updated Medication List - Protect others around you: Learn how to safely use, store and throw away your medicines at www.disposemymeds.org.          This list is accurate as of: 10/11/17  8:17 AM.  Always use your most recent med list.                   Brand Name Dispense Instructions for use Diagnosis    aspirin-acetaminophen-caffeine 250-250-65 MG per tablet    EXCEDRIN MIGRAINE     Take 1 tablet by mouth every 6 hours as needed for headaches Reported on 5/18/2017        azithromycin 250 MG tablet    ZITHROMAX    6 tablet    Two tablets first day, then one tablet daily for four days.    Chills       cyclobenzaprine 10 MG tablet    FLEXERIL     TAKE ONE-HALF TO ONE TABLET BY MOUTH THREE TIMES A DAY AS NEEDED FOR MUSCLE SPASM        ibuprofen 200 MG tablet    ADVIL/MOTRIN    1 tablet    Take 4 tablets (800 mg) by mouth every 8 hours as needed for mild pain    Bilateral low back pain, with sciatica presence unspecified

## 2017-10-11 NOTE — LETTER
[address] [address]  [phone]          October 11, 2017    RE:  Moris Bowers                                                                                                                                                       [address] [address]  [place] [address]          To whom it may concern:    Moris Bowers is under my professional care for    Chills  Abdominal pain, generalized  Blood in stool      He is excused from work for the week of 10/9/17 and may return to work 10/16/17 if he is feeling better.       Sincerely,        HALEIGH Persaud, NP-C

## 2017-10-12 ASSESSMENT — ANXIETY QUESTIONNAIRES: GAD7 TOTAL SCORE: 6

## 2017-10-13 NOTE — TELEPHONE ENCOUNTER
Spoke to patient, patient states he had appointment for pain clinic on Tuesday, but was sick and had to reschedule; he has not done so yet. Asked patient to call if he has any more questions or concerns.     Will flag as FYI for EM

## 2017-10-17 ENCOUNTER — MYC MEDICAL ADVICE (OUTPATIENT)
Dept: FAMILY MEDICINE | Facility: OTHER | Age: 32
End: 2017-10-17

## 2017-10-19 NOTE — TELEPHONE ENCOUNTER
[employer] message read on 10/18/2017 at 2:59 PM by NIC Bowers.  No response received at this time. Closing encounter. Ibeth Caal RN, BSN

## 2017-11-22 ENCOUNTER — TELEPHONE (OUTPATIENT)
Dept: PALLIATIVE MEDICINE | Facility: CLINIC | Age: 32
End: 2017-11-22

## 2017-11-22 ENCOUNTER — OFFICE VISIT (OUTPATIENT)
Dept: PALLIATIVE MEDICINE | Facility: CLINIC | Age: 32
End: 2017-11-22
Payer: COMMERCIAL

## 2017-11-22 VITALS
BODY MASS INDEX: 35.8 KG/M2 | HEART RATE: 105 BPM | SYSTOLIC BLOOD PRESSURE: 162 MMHG | DIASTOLIC BLOOD PRESSURE: 88 MMHG | WEIGHT: 258 LBS

## 2017-11-22 DIAGNOSIS — M79.18 MYOFASCIAL MUSCLE PAIN: ICD-10-CM

## 2017-11-22 DIAGNOSIS — M47.812 CERVICAL SPONDYLOSIS WITHOUT MYELOPATHY: ICD-10-CM

## 2017-11-22 DIAGNOSIS — M47.817 LUMBOSACRAL SPONDYLOSIS WITHOUT MYELOPATHY: Primary | ICD-10-CM

## 2017-11-22 PROCEDURE — 99215 OFFICE O/P EST HI 40 MIN: CPT | Performed by: PAIN MEDICINE

## 2017-11-22 RX ORDER — MELOXICAM 15 MG/1
15 TABLET ORAL DAILY
Qty: 30 TABLET | Refills: 1 | Status: SHIPPED | OUTPATIENT
Start: 2017-11-22 | End: 2017-12-22 | Stop reason: ALTCHOICE

## 2017-11-22 ASSESSMENT — PATIENT HEALTH QUESTIONNAIRE - PHQ9: SUM OF ALL RESPONSES TO PHQ QUESTIONS 1-9: 14

## 2017-11-22 ASSESSMENT — PAIN SCALES - GENERAL: PAINLEVEL: EXTREME PAIN (8)

## 2017-11-22 NOTE — PATIENT INSTRUCTIONS
- Further procedures recommended: Lumbar MEDIAL BRANCH BLOCK  Order placed patient will be contacted   - Medication Management:    - Zanaflex 4 mg twice a day start with PM dose for 3 days before adding am dose   - Start Mobic 15mg daily, DO NOT TAKE ANY OTHER NSAIDS(Ibuprofen) while taking mobic   - Physical Therapy: Pain PHYSICAL THERAPY Order placed patient will be contacted   - Clinical Health Psychologist to address issues of relaxation, behavioral change, coping style, and other factors important to improvement: consider in the future  - Diagnostic Studies: none  - Urine toxicology screen today: none   - Follow up: 2-4 weeks for a procedure.

## 2017-11-22 NOTE — MR AVS SNAPSHOT
After Visit Summary   11/22/2017    Moris FARIA Edna    MRN: [id number]           Patient Information     Date Of Birth          1985        Visit Information        Provider Department      11/22/2017 8:00 AM Gal Kahn MD [facility]        Today's Diagnoses     Lumbosacral spondylosis without myelopathy    -  1    Myofascial muscle pain        Cervical spondylosis without myelopathy          Care Instructions    - Further procedures recommended: Lumbar MEDIAL BRANCH BLOCK  Order placed patient will be contacted   - Medication Management:    - Zanaflex 4 mg twice a day start with PM dose for 3 days before adding am dose   - Start Mobic 15mg daily, DO NOT TAKE ANY OTHER NSAIDS(Ibuprofen) while taking mobic   - Physical Therapy: Pain PHYSICAL THERAPY Order placed patient will be contacted   - Clinical Health Psychologist to address issues of relaxation, behavioral change, coping style, and other factors important to improvement: consider in the future  - Diagnostic Studies: none  - Urine toxicology screen today: none   - Follow up: 2-4 weeks for a procedure.                Follow-ups after your visit        Additional Services     PAIN INJECTION EVAL/TREAT/FOLLOW UP           PAIN PT EVAL AND TREAT                 Who to contact     If you have questions or need follow up information about today's clinic visit or your schedule please contact [facility] directly at [phone].  Normal or non-critical lab and imaging results will be communicated to you by MyChart, letter or phone within 4 business days after the clinic has received the results. If you do not hear from us within 7 days, please contact the clinic through MyChart or phone. If you have a critical or abnormal lab result, we will notify you by phone as soon as possible.  Submit refill requests through [employer] or call your pharmacy and they will forward the refill request to us. Please allow  3 business days for your refill to be completed.          Additional Information About Your Visit        [employer] Information     [employer] gives you secure access to your electronic health record. If you see a primary care provider, you can also send messages to your care team and make appointments. If you have questions, please call your primary care clinic.  If you do not have a primary care provider, please call [phone] and they will assist you.        Care EveryWhere ID     This is your Care EveryWhere ID. This could be used by other organizations to access your [place] medical records  [id number]        Your Vitals Were     Pulse BMI (Body Mass Index)                105 35.8 kg/m2           Blood Pressure from Last 3 Encounters:   11/22/17 162/88   10/11/17 114/82   09/25/17 124/76    Weight from Last 3 Encounters:   11/22/17 117 kg (258 lb)   10/11/17 114.9 kg (253 lb 3.2 oz)   10/06/17 115.8 kg (255 lb 6.4 oz)              We Performed the Following     PAIN INJECTION EVAL/TREAT/FOLLOW UP     PAIN PT EVAL AND TREAT          Today's Medication Changes          These changes are accurate as of: 11/22/17  9:02 AM.  If you have any questions, ask your nurse or doctor.               Start taking these medicines.        Dose/Directions    meloxicam 15 MG tablet   Commonly known as:  MOBIC   Used for:  Myofascial muscle pain, Lumbosacral spondylosis without myelopathy        Dose:  15 mg   Take 1 tablet (15 mg) by mouth daily   Quantity:  30 tablet   Refills:  1       tiZANidine 4 MG tablet   Commonly known as:  ZANAFLEX   Used for:  Myofascial muscle pain        Dose:  4 mg   Take 1 tablet (4 mg) by mouth 2 times daily as needed for muscle spasms   Quantity:  60 tablet   Refills:  1            Where to get your medicines      These medications were sent to [facility] #[id number] - [place], [place] - [address] [address]  [address] [address] [facility] [address]     Phone:  [phone]     meloxicam 15 MG tablet     tiZANidine 4 MG tablet                Primary Care Provider Office Phone # Fax #    Yaritza Savita Castorena [phone]       [address] [place] [address]        Equal Access to Services     FREDERIC HOBBS : Hadii aad ku hadmarieo Sojohn, waaxda luqadaha, qaybta kaalmada adeolamideyada, marcelino corrales laRandachristopher escobar. So [facility] [phone].    ATENCIÓN: Si habla español, tiene a martinez disposición servicios gratuitos de asistencia lingüística. Llame al [phone].    We comply with applicable federal civil rights laws and [place] laws. We do not discriminate on the basis of race, color, national origin, age, disability, sex, sexual orientation, or gender identity.            Thank you!     Thank you for choosing [facility]  for your care. Our goal is always to provide you with excellent care. Hearing back from our patients is one way we can continue to improve our services. Please take a few minutes to complete the written survey that you may receive in the mail after your visit with us. Thank you!             Your Updated Medication List - Protect others around you: Learn how to safely use, store and throw away your medicines at www.disposemymeds.org.          This list is accurate as of: 11/22/17  9:02 AM.  Always use your most recent med list.                   Brand Name Dispense Instructions for use Diagnosis    aspirin-acetaminophen-caffeine 250-250-65 MG per tablet    EXCEDRIN MIGRAINE     Take 1 tablet by mouth every 6 hours as needed for headaches Reported on 5/18/2017        azithromycin 250 MG tablet    ZITHROMAX    6 tablet    Two tablets first day, then one tablet daily for four days.    Chills       cyclobenzaprine 10 MG tablet    FLEXERIL     TAKE ONE-HALF TO ONE TABLET BY MOUTH THREE TIMES A DAY AS NEEDED FOR MUSCLE SPASM        ibuprofen 200 MG tablet    ADVIL/MOTRIN    1 tablet    Take 4 tablets (800 mg) by mouth every 8 hours as needed for mild pain    Bilateral low  back pain, with sciatica presence unspecified       meloxicam 15 MG tablet    MOBIC    30 tablet    Take 1 tablet (15 mg) by mouth daily    Myofascial muscle pain, Lumbosacral spondylosis without myelopathy       tiZANidine 4 MG tablet    ZANAFLEX    60 tablet    Take 1 tablet (4 mg) by mouth 2 times daily as needed for muscle spasms    Myofascial muscle pain

## 2017-11-22 NOTE — TELEPHONE ENCOUNTER
Pre-screening questions for Radiology Injections:    Injection to be done at which interventional clinic site? [facility]    Procedure ordered by Dr. Kahn    Procedure ordered? Lumbar Medial Branch Block    What insurance would patient like us to bill for this procedure? [employer]      [employer]- Any injection DO NOT SCHEDULE and route to Sierra Reyes.      [employer] insurance - If scheduling an SI joint injection DO NOT SCHEDULE and route to Suzanne Zhen.     [employer]- MBB's must be scheduled at LEAST two weeks apart      [employer] - Any injection besides hip/shoulder/knee joint DO NOT SCHEDULE and route to Suzanne Fontaine. She will obtain PA and call pt back to schedule procedure or notify pt of denial.     [employer]- PA required for all MBB's    Any chance of pregnancy? Not Applicable   If YES, do NOT schedule and route to RN pool    Is an  needed? No     Patient has a drive home? (mandatory) Yes     Is patient taking any blood thinners (plavix, coumadin, jantoven, warfarin, heparin, pradaxa or dabigatran )? No    If hold needed, do NOT schedule, route to RN pool     Is patient taking any aspirin products? Yes - Pt takes 200 mg daily; instructed to hold 0 day(s) prior to procedure.      If more than 325mg/day do NOT schedule; route to RN pool     For CERVICAL procedures, hold all aspirin products for 6 days.      Does the patient have a bleeding or clotting disorder? No    If YES, okay to schedule AND route to RN nurse pool    **For any patients with platelet count <100, must be forwarded to provider**    Is patient diabetic? No If YES, have them bring their glucometer.    Does patient have an active infection or treated for one within the past week? No    Is patient currently taking any antibiotics?  No    For patients on chronic, preventative, or prophylacti antibiotics, procedures can be scheduled.     For patients on antibiotics for active or recent infection:    Drs.  [place], [place], [place], Castro, Snitzer-antibiotic course must have been completed for 4 days     Dr. Orr-antibiotic course must have been completed for 7 days    Is patient currently taking any steroid medications? (i.e. Prednisone, Medrol)  No     For patients on steroid medications:    Yeni Rico Burton, Snitzer-steroid course must have been completed for 4 days    Dr. Orr-steroid course must have been completed for 7 days    Review with patient:  If you are started on any steroids or antibiotics between now and your appointment, you must contact us because it may affect our ability to perform your procedure     Is patient actively being treated for cancer or immunocompromised? No   If YES, do NOT schedule and route to RN    Are you able to get on and off an exam table with minimal or no assistance? Yes   If NO, do NOT schedule and route to RN    Are you able to roll over and lay on your stomach with minimal or no assistance? Yes   If NO, do NOT schedule and route to RN    Any allergies to contrast dye, iodine, shellfish, or numbing and steroid medications? No  (If so, inform nursing and note in scheduling comments.)    Allergies: Bees; No known drug allergies; and Wellbutrin [bupropion]     Has the patient had a flu shot or any other vaccinations within 7 days before or after the procedure.  No     Does patient have an MRI/CT?  YES:   (SI joint, hip injections, lumbar sympathetic blocks, and stellate ganglion blocks do not require an MRI)    Was the MRI done w/in the last 3 years?  Yes    Was MRI done at [facility]? Yes      If not, where was it done? N/A       If MRI was not done at [facility], [facility] or [facility] do NOT schedule and route to nursing.  If pt has an imaging disc, the injection may be scheduled but pt has to bring disc to appt. If they show up w/out disc the injection cannot be done    **Must be scheduled with elapsed time interval of at least 2 weeks and not more than 6 months  between the First MBB and the Second MBB**       Medial Branch Block Pre-Procedure Instructions    It is okay to take long acting pain medications (if you are on them) the day of the procedure but try not to take any short acting medications unless absolutely necessary.         Long acting meds would include: Gabapentin (Neurontin), MS Contin, Oxycontin        Short acting meds would include:  Percocet, Oxycodone, Vicodin, Ibuprofen     The day of the procedure, you should try to do things that provoke your pain, since the injection is being done to see if it will relieve your pain .     If your pain level is a 4 out of 10 or less on the day of the procedure, please call [phone] to reschedule.    Reminders (please tell patient if applicable):      Instructed pt to arrive 30 minutes early for IV start if this is for a cervical procedure, ALL sympathetic (stellate ganglion, hypogastric, or lumbar sympathetic block) and all sedation procedures (RFA, spinal cord stimulation trials).         -IVs are not routinely placed for Dr. Castro cervical cases        -Dr. Kahn: no IV needed for CMBB       If NPO for sedation, it is okay to take medications with sips of water (except if they are to hold blood thinners).    *DO take blood pressure medication if it is prescribed*      If this is for a cervical MBB aspirin needs to be held for 6 days.        Do not schedule procedures requiring IV placement in the first appointment after lunch       For patients 85 or older we recommend having an adult stay w/ them for the remainder of the day.      Does the patient have any questions? Mesha MOURA    [facility] [place]
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
## 2017-11-22 NOTE — NURSING NOTE
"Chief Complaint   Patient presents with     Pain       Initial /88  Pulse 105 Estimated body mass index is 35.14 kg/(m^2) as calculated from the following:    Height as of 10/6/17: 1.808 m (5' 11.18\").    Weight as of 10/11/17: 114.9 kg (253 lb 3.2 oz).  Medication Reconciliation: complete     Shamir Rendon MA      "

## 2017-12-04 ENCOUNTER — RADIANT APPOINTMENT (OUTPATIENT)
Dept: GENERAL RADIOLOGY | Facility: CLINIC | Age: 32
End: 2017-12-04
Attending: PAIN MEDICINE

## 2017-12-04 ENCOUNTER — RADIOLOGY INJECTION OFFICE VISIT (OUTPATIENT)
Dept: PALLIATIVE MEDICINE | Facility: CLINIC | Age: 32
End: 2017-12-04
Payer: COMMERCIAL

## 2017-12-04 VITALS — SYSTOLIC BLOOD PRESSURE: 130 MMHG | DIASTOLIC BLOOD PRESSURE: 85 MMHG | HEART RATE: 65 BPM | OXYGEN SATURATION: 98 %

## 2017-12-04 DIAGNOSIS — M47.817 LUMBOSACRAL SPONDYLOSIS WITHOUT MYELOPATHY: Primary | ICD-10-CM

## 2017-12-04 DIAGNOSIS — M47.817 LUMBOSACRAL SPONDYLOSIS WITHOUT MYELOPATHY: ICD-10-CM

## 2017-12-04 PROCEDURE — 64494 INJ PARAVERT F JNT L/S 2 LEV: CPT | Mod: 50 | Performed by: PAIN MEDICINE

## 2017-12-04 PROCEDURE — 64493 INJ PARAVERT F JNT L/S 1 LEV: CPT | Mod: 50 | Performed by: PAIN MEDICINE

## 2017-12-04 NOTE — NURSING NOTE
Pre-procedure Intake    Have you been fasting? NA    If yes, for how long?     Are you taking a prescribed blood thinner such as coumadin, Plavix, Xarelto?    No    If yes, when did you take your last dose?     Do you take aspirin?  No    If cervical procedure, have you held aspirin for 6 days?   NA    Do you have any allergies to contrast dye, iodine, steroid and/or numbing medications?  NO    Are you currently taking antibiotics or have an active infection?  NO    Have you had a fever/elevated temperature within the past week? NO    Are you currently taking oral steroids? NO    Do you have a ? Yes       Are you pregnant or breastfeeding?  Not Applicable    Are the vital signs normal?  NO /93

## 2017-12-04 NOTE — NURSING NOTE
Discharge Information    IV Discontiued Time:  NA    Amount of Fluid Infused:  NA    Discharge Criteria = When patient returns to baseline or as per MD order    Consciousness:  Pt is fully awake    Circulation:  BP +/- 20% of pre-procedure level    Respiration:  Patient is able to breathe deeply    O2 Sat:  Patient is able to maintain O2 Sat >92% on room air    Activity:  Moves 4 extremities on command    Ambulation:  Patient is able to stand and walk or stand and pivot into wheelchair    Dressing:  Clean/dry or No Dressing    Notes:   Discharge instructions and AVS given to patient    Patient meets criteria for discharge?  YES    Admitted to PCU?  No    Responsible adult present to accompany patient home?  Yes    Signature/Title:    Ginette Harding  RN Care Coordinator  [facility] [place]
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
## 2017-12-04 NOTE — MR AVS SNAPSHOT
After Visit Summary   12/4/2017    Moris FARIA Athol    MRN: [id number]           Patient Information     Date Of Birth          1985        Visit Information        Provider Department      12/4/2017 7:45 AM Gal Kahn MD [facility]        Care Instructions    [facility]   Medial Branch Block Discharge Instructions  Nurse line:  [phone]  Appointment line:  [phone]    Your procedure was performed by:Dr. Gal Kahn    Medications used: lidocaine (local anesthetic) , bupivacaine (anesthetic)    You will need to complete the Pain Scale Log form and return it to us as soon as possible.  Once we have received the form, we will review it and call you to determine the next steps.     The form can be faxed to [phone] or mailed to:   [facility] [place] - [address], [facility] [address] [address]      You may resume your regular activity after the injection.    Be cautious with walking since you may have numbness and/or weakness in the lower extremities for up to 6-8 hours after the procedure due to the effects of the local anesthetic.    Avoid driving for 6 hours. The local anesthetic could slow your reflexes    You may shower, however no swimming or tub baths or hot tubs for 24 hours following your procedure.    Your pain will return after the numbing medications have worn off.  You may use your current pain medications as needed.    You may use anti-inflammatory medications (such as ibuprofen, aleve, or advil) or Tylenol for pain control if necessary.  Some people find it helpful to alternate ibuprofen and Tylenol every 3 hours for a couple of days.    You may use ice packs 10-15 minutes three to four times a day at the injection site for comfort.     Do not use heat to painful areas for 6 to 8 hours. This will give the local anesthetic time to wear off and prevent  you from accidentally burning your skin.     If you experience any of the following, call the pain center nursing line during work hours at [phone] or the on-call after hours physician line is [phone]:  -Fever over 100 degree F  -Swelling, bleeding, redness, drainage, warmth at the injection site  -Progressive weakness or numbness on your legs or arms  -If lumbar, call if you have a loss of bowel or bladder function  -If cervical, call if you have any unusual headache that is not relieved by Tylenol  -Unusual new onset of pain that is not improving              Follow-ups after your visit        Who to contact     If you have questions or need follow up information about today's clinic visit or your schedule please contact [facility] directly at [phone].  Normal or non-critical lab and imaging results will be communicated to you by Fuelzeehart, letter or phone within 4 business days after the clinic has received the results. If you do not hear from us within 7 days, please contact the clinic through FreshDigitalGroupt or phone. If you have a critical or abnormal lab result, we will notify you by phone as soon as possible.  Submit refill requests through Backtrace I/O or call your pharmacy and they will forward the refill request to us. Please allow 3 business days for your refill to be completed.          Additional Information About Your Visit        Backtrace I/O Information     Backtrace I/O gives you secure access to your electronic health record. If you see a primary care provider, you can also send messages to your care team and make appointments. If you have questions, please call your primary care clinic.  If you do not have a primary care provider, please call [phone] and they will assist you.        Care EveryWhere ID     This is your Care EveryWhere ID. This could be used by other organizations to access your [facility] medical records  [id number]         Blood Pressure from Last 3 Encounters:    11/22/17 162/88   10/11/17 114/82   09/25/17 124/76    Weight from Last 3 Encounters:   11/22/17 117 kg (258 lb)   10/11/17 114.9 kg (253 lb 3.2 oz)   10/06/17 115.8 kg (255 lb 6.4 oz)              Today, you had the following     No orders found for display       Primary Care Provider Office Phone # Fax #    Yaritza Savita Castorena [phone]       [address] [place] [address]        Equal Access to Services     [facility]: Hadii merlyn higgins hadasho Sojohn, waaxda luqadaha, qaybta kaalmada radu, marcelino lea . So [facility] [phone].    ATENCIÓN: Si habla español, tiene a martinez disposición servicios gratuitos de asistencia lingüística. [facility] [phone].    We comply with applicable federal civil rights laws and [place] laws. We do not discriminate on the basis of race, color, national origin, age, disability, sex, sexual orientation, or gender identity.            Thank you!     Thank you for choosing [facility]  for your care. Our goal is always to provide you with excellent care. Hearing back from our patients is one way we can continue to improve our services. Please take a few minutes to complete the written survey that you may receive in the mail after your visit with us. Thank you!             Your Updated Medication List - Protect others around you: Learn how to safely use, store and throw away your medicines at www.disposemymeds.org.          This list is accurate as of: 12/4/17  7:48 AM.  Always use your most recent med list.                   Brand Name Dispense Instructions for use Diagnosis    aspirin-acetaminophen-caffeine 250-250-65 MG per tablet    EXCEDRIN MIGRAINE     Take 1 tablet by mouth every 6 hours as needed for headaches Reported on 5/18/2017        azithromycin 250 MG tablet    ZITHROMAX    6 tablet    Two tablets first day, then one tablet daily for four days.    Chills       cyclobenzaprine 10 MG tablet    FLEXERIL      TAKE ONE-HALF TO ONE TABLET BY MOUTH THREE TIMES A DAY AS NEEDED FOR MUSCLE SPASM        ibuprofen 200 MG tablet    ADVIL/MOTRIN    1 tablet    Take 4 tablets (800 mg) by mouth every 8 hours as needed for mild pain    Bilateral low back pain, with sciatica presence unspecified       meloxicam 15 MG tablet    MOBIC    30 tablet    Take 1 tablet (15 mg) by mouth daily    Myofascial muscle pain, Lumbosacral spondylosis without myelopathy       tiZANidine 4 MG tablet    ZANAFLEX    60 tablet    Take 1 tablet (4 mg) by mouth 2 times daily as needed for muscle spasms    Myofascial muscle pain

## 2017-12-04 NOTE — PATIENT INSTRUCTIONS
[facility]   Medial Branch Block Discharge Instructions  Nurse line:  [phone]  Appointment line:  [phone]    Your procedure was performed by:Dr. Gal Kahn    Medications used: lidocaine (local anesthetic) , bupivacaine (anesthetic)    You will need to complete the Pain Scale Log form and return it to us as soon as possible.  Once we have received the form, we will review it and call you to determine the next steps.     The form can be faxed to [phone] or mailed to:   [facility]    [address] [address] [address]      You may resume your regular activity after the injection.    Be cautious with walking since you may have numbness and/or weakness in the lower extremities for up to 6-8 hours after the procedure due to the effects of the local anesthetic.    Avoid driving for 6 hours. The local anesthetic could slow your reflexes    You may shower, however no swimming or tub baths or hot tubs for 24 hours following your procedure.    Your pain will return after the numbing medications have worn off.  You may use your current pain medications as needed.    You may use anti-inflammatory medications (such as ibuprofen, aleve, or advil) or Tylenol for pain control if necessary.  Some people find it helpful to alternate ibuprofen and Tylenol every 3 hours for a couple of days.    You may use ice packs 10-15 minutes three to four times a day at the injection site for comfort.     Do not use heat to painful areas for 6 to 8 hours. This will give the local anesthetic time to wear off and prevent you from accidentally burning your skin.     If you experience any of the following, call the pain center nursing line during work hours at [phone] or the on-call after hours physician line is [phone]:  -Fever over 100 degree F  -Swelling, bleeding, redness, drainage, warmth at the injection  site  -Progressive weakness or numbness on your legs or arms  -If lumbar, call if you have a loss of bowel or bladder function  -If cervical, call if you have any unusual headache that is not relieved by Tylenol  -Unusual new onset of pain that is not improving

## 2017-12-05 ENCOUNTER — TELEPHONE (OUTPATIENT)
Dept: PALLIATIVE MEDICINE | Facility: CLINIC | Age: 32
End: 2017-12-05

## 2017-12-05 ENCOUNTER — OFFICE VISIT (OUTPATIENT)
Dept: PALLIATIVE MEDICINE | Facility: CLINIC | Age: 32
End: 2017-12-05
Payer: COMMERCIAL

## 2017-12-05 DIAGNOSIS — M47.812 CERVICAL SPONDYLOSIS WITHOUT MYELOPATHY: ICD-10-CM

## 2017-12-05 DIAGNOSIS — G89.29 CHRONIC PAIN: Primary | ICD-10-CM

## 2017-12-05 DIAGNOSIS — M47.817 LUMBOSACRAL SPONDYLOSIS WITHOUT MYELOPATHY: ICD-10-CM

## 2017-12-05 DIAGNOSIS — M79.18 MYOFASCIAL MUSCLE PAIN: ICD-10-CM

## 2017-12-05 PROCEDURE — 97161 PT EVAL LOW COMPLEX 20 MIN: CPT | Mod: GP | Performed by: PHYSICAL THERAPIST

## 2017-12-05 PROCEDURE — 97110 THERAPEUTIC EXERCISES: CPT | Mod: GP | Performed by: PHYSICAL THERAPIST

## 2017-12-05 NOTE — TELEPHONE ENCOUNTER
Lumbar MBB#1 pain data reviewed. Max relief obtained: 100% for rest, right and left facet load for hour 1-8     At rest:                    100% for hour 1-8  Right facet load:        100% for hour 1-8  Left facet load:        100% for hour 1-8  Normal activity:       89% for hour 1, then 100% hour 2-8     Routing to provider to review, if ok to proceed route to  to schedule L MBB #2. Patient would like to proceed with MBB #2. Reports feeling best in years.    Reports PT ezra 12/05/17 in [place]  BSN-RN Care Coordinator  [facility]

## 2017-12-05 NOTE — MR AVS SNAPSHOT
After Visit Summary   12/5/2017    Moris Bowers    MRN: [id number]           Patient Information     Date Of Birth          1985        Visit Information        Provider Department      12/5/2017 3:15 PM Cleo Chatman, PT [facility]        Today's Diagnoses     Chronic pain    -  1    Lumbosacral spondylosis without myelopathy        Myofascial muscle pain        Cervical spondylosis without myelopathy           Follow-ups after your visit        Your next 10 appointments already scheduled     Dec 14, 2017  2:30 PM CST   Return Visit with Cleo Chatman PT   [facility] [place] ([facility] [place])    [address]  [place] [address]   [phone]            Dec 21, 2017  2:30 PM CST   Return Visit with Cleo Chatman PT   [facility] [place] ([facility] [place])    [address]  [place] [address]   [phone]            Dec 22, 2017  9:15 AM CST   Radiology Injections with Gal Kahn MD   [facility] [place] ([facility] [place])    [address]  [place] [address]   [phone]            Dec 28, 2017  2:30 PM CST   Return Visit with Cleo Chatman PT   [facility] [place] ([facility] [place])    [address]  [place] [id number] [phone]              Who to contact     If you have questions or need follow up information about today's clinic visit or your schedule please contact [facility] directly at [phone].  Normal or non-critical lab and imaging results will be communicated to you by MyChart, letter or phone within 4 business days after the clinic has received the results. If you do not hear from us within 7 days, please contact the clinic through MyChart or phone. If you have a critical or abnormal lab result, we will notify you by phone as soon as possible.  Submit refill requests  through TabUp or call your pharmacy and they will forward the refill request to us. Please allow 3 business days for your refill to be completed.          Additional Information About Your Visit        Fiteezahart Information     TabUp gives you secure access to your electronic health record. If you see a primary care provider, you can also send messages to your care team and make appointments. If you have questions, please call your primary care clinic.  If you do not have a primary care provider, please call [phone] and they will assist you.        Care EveryWhere ID     This is your Care EveryWhere ID. This could be used by other organizations to access your [facility] medical records  [id number]         Blood Pressure from Last 3 Encounters:   12/04/17 130/85   11/22/17 162/88   10/11/17 114/82    Weight from Last 3 Encounters:   11/22/17 117 kg (258 lb)   10/11/17 114.9 kg (253 lb 3.2 oz)   10/06/17 115.8 kg (255 lb 6.4 oz)              We Performed the Following     HC PT EVAL, LOW COMPLEXITY     THERAPEUTIC EXERCISES        Primary Care Provider Office Phone # Fax #    Yaritza Savita Castorena [phone]       [address] [address]        Equal Access to Services     [facility] : Hadii merlyn higgins hadasho Soomaali, waaxda luqadaha, qaybta kaalmada adeegyada, marcelino escobar. So [facility] [phone].    ATENCIÓN: Si habla español, tiene a martinez disposición servicios gratPlayerProos de asistencia lingüística. Llame al [phone].    We comply with applicable federal civil rights laws and [place] laws. We do not discriminate on the basis of race, color, national origin, age, disability, sex, sexual orientation, or gender identity.            Thank you!     Thank you for choosing [facility]  for your care. Our goal is always to provide you with excellent care. Hearing back from our patients is one way we can continue to improve our services. Please take a  few minutes to complete the written survey that you may receive in the mail after your visit with us. Thank you!             Your Updated Medication List - Protect others around you: Learn how to safely use, store and throw away your medicines at www.disposemymeds.org.          This list is accurate as of: 12/5/17 11:59 PM.  Always use your most recent med list.                   Brand Name Dispense Instructions for use Diagnosis    aspirin-acetaminophen-caffeine 250-250-65 MG per tablet    EXCEDRIN MIGRAINE     Take 1 tablet by mouth every 6 hours as needed for headaches Reported on 5/18/2017        azithromycin 250 MG tablet    ZITHROMAX    6 tablet    Two tablets first day, then one tablet daily for four days.    Chills       cyclobenzaprine 10 MG tablet    FLEXERIL     TAKE ONE-HALF TO ONE TABLET BY MOUTH THREE TIMES A DAY AS NEEDED FOR MUSCLE SPASM        ibuprofen 200 MG tablet    ADVIL/MOTRIN    1 tablet    Take 4 tablets (800 mg) by mouth every 8 hours as needed for mild pain    Bilateral low back pain, with sciatica presence unspecified       meloxicam 15 MG tablet    MOBIC    30 tablet    Take 1 tablet (15 mg) by mouth daily    Myofascial muscle pain, Lumbosacral spondylosis without myelopathy       tiZANidine 4 MG tablet    ZANAFLEX    60 tablet    Take 1 tablet (4 mg) by mouth 2 times daily as needed for muscle spasms    Myofascial muscle pain
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
## 2017-12-07 NOTE — TELEPHONE ENCOUNTER
Scheduled for 12/22/17. Closing.    Ginette SIMMSN-RN Care Coordinator  [facility]

## 2017-12-14 ENCOUNTER — OFFICE VISIT (OUTPATIENT)
Dept: PALLIATIVE MEDICINE | Facility: CLINIC | Age: 32
End: 2017-12-14
Payer: COMMERCIAL

## 2017-12-14 DIAGNOSIS — M47.817 LUMBOSACRAL SPONDYLOSIS WITHOUT MYELOPATHY: ICD-10-CM

## 2017-12-14 DIAGNOSIS — M79.18 MYOFASCIAL MUSCLE PAIN: ICD-10-CM

## 2017-12-14 DIAGNOSIS — M47.812 CERVICAL SPONDYLOSIS WITHOUT MYELOPATHY: ICD-10-CM

## 2017-12-14 DIAGNOSIS — G89.29 CHRONIC PAIN: Primary | ICD-10-CM

## 2017-12-14 PROCEDURE — 97112 NEUROMUSCULAR REEDUCATION: CPT | Mod: GP | Performed by: PHYSICAL THERAPIST

## 2017-12-14 NOTE — PROGRESS NOTES
Pain Physical Therapy Progress Note    Patient Name: Moris Bowers     YOB: 1985     Medical Record Number: [id number]    Visits: 2/10    Diagnosis:    Chronic pain  Lumbosacral spondylosis without myelopathy  Myofascial muscle pain  Cervical spondylosis without myelopathy    Subjective: Patient reports that today is a bad day for the back--no specific reason why.    Busy at work    Self Care  HEP: has been doing stretches--no change  Walking/Aerobic Activity: walking a lot at work  Heat/Ice: NA  Posture: has been more aware of trying to stand on both LEs equally  Mini Breaks: NA  Breathing/Relaxation: NA  Pacing: NA    Objective Findings:    OBSERVATION: guarded posture, rounded shoulders, forward head, clenching jaw--states that he wakes up with sore jaw; does have mouth guard, but during the night it comes out; fair core engagement; upper chest breathing  GAIT & LOCOMOTION: non-antalgic   MUSCLE PERFORMANCE: cues for small movements with cervical and scapular retraction    Treatment Interventions:  Neuromuscular Reeducation:   Posture and Kinetic body awareness:  went over basic posture cues in sitting and standing, and walking, used mirror to assist with visual feedback and body awareness.  Discussed with patient that it does take time to learn; it is important to start working on it and gradually will become new habit; patient may have some muscles that are more sore as they are starting to be used in different ways, and that is OK; gave handout.  May need to use different cues to help as reminders/alerts as working on body awareness.  Discussed basic alignment of ears, shoulders, hips.  __________________________________________________________________    Instruction on home program: posture    Assessment:    Patient tolerated/responded well to treatment    Impression/Recommendations: continue    Intensity Level: 2 (1=low intensity; 5=high intensity)    Demonstrates/Verbalizes Technique:  2, 3 (1= poor technique-difficulty performing exercises,significant cues required; 5= good technique-performs exercises without cues)    Body Awareness: 2, 3 (1=low awareness; 5=high awareness)    Posture/Stability: 2 (1= poor posture, stability; 5= good posture, stability)    Motivational Level: Cooperative    Response to Teaching: cooperative    Factors that affect learning: None    _______________________________________________________________________  Plan of Care   Continue PT to support reactivation and integration of self regulation pain management skills;  Focus of next session will be on: posture review, breathing; self care     Present:  RICHARD     Total Visit Time:  45 minutes  Neuro-Re Ed: 45 mins    Therapist: Cleo Chatman PT             Date: 12/14/2017

## 2017-12-14 NOTE — MR AVS SNAPSHOT
After Visit Summary   12/14/2017    Moris Bowers    MRN: [id number]           Patient Information     Date Of Birth          1985        Visit Information        Provider Department      12/14/2017 2:30 PM Cleo Chatman, LUIS M [facility] [place]        Today's Diagnoses     Chronic pain    -  1    Lumbosacral spondylosis without myelopathy        Myofascial muscle pain        Cervical spondylosis without myelopathy           Follow-ups after your visit        Your next 10 appointments already scheduled     Dec 21, 2017  2:30 PM CST   Return Visit with Cleo Chatman PT   [facility] [place] ([facility] [place])    [address] [address]   [phone]            Dec 22, 2017  9:15 AM CST   Radiology Injections with Gal Kahn MD   [facility] ([facility] [place])    [address]  [place] [address]   [phone]            Dec 28, 2017  2:30 PM CST   Return Visit with Cleo Chatman PT   [facility] ([facility] [place])    [address] [address]   [phone]              Who to contact     If you have questions or need follow up information about today's clinic visit or your schedule please contact [facility] directly at [phone].  Normal or non-critical lab and imaging results will be communicated to you by MyChart, letter or phone within 4 business days after the clinic has received the results. If you do not hear from us within 7 days, please contact the clinic through MyChart or phone. If you have a critical or abnormal lab result, we will notify you by phone as soon as possible.  Submit refill requests through [employer]-MOUSE or call your pharmacy and they will forward the refill request to us. Please allow 3 business days for your refill to be completed.          Additional Information About Your Visit         [employer] Information     [employer] gives you secure access to your electronic health record. If you see a primary care provider, you can also send messages to your care team and make appointments. If you have questions, please call your primary care clinic.  If you do not have a primary care provider, please call [phone] and they will assist you.        Care EveryWhere ID     This is your Care EveryWhere ID. This could be used by other organizations to access your [place] medical records  [id number]         Blood Pressure from Last 3 Encounters:   12/04/17 130/85   11/22/17 162/88   10/11/17 114/82    Weight from Last 3 Encounters:   11/22/17 117 kg (258 lb)   10/11/17 114.9 kg (253 lb 3.2 oz)   10/06/17 115.8 kg (255 lb 6.4 oz)              We Performed the Following     NEUROMUSCULAR RE-EDUCATION        Primary Care Provider Office Phone # Fax #    Yaritza Savita Castorena [phone]       [address] [place] [address]        Equal Access to Services     [facility]: Hadii aad ku hadasho Soomaali, waaxda luqadaha, qaybta kaalmada adeegyada, waxmaikol de paz haychristopher lea . So [facility] [phone].    ATENCIÓN: Si habla español, tiene a martinez disposición servicios gratuitos de asistencia lingüística. Llame al [phone].    We comply with applicable federal civil rights laws and [place] laws. We do not discriminate on the basis of race, color, national origin, age, disability, sex, sexual orientation, or gender identity.            Thank you!     Thank you for choosing [facility]  for your care. Our goal is always to provide you with excellent care. Hearing back from our patients is one way we can continue to improve our services. Please take a few minutes to complete the written survey that you may receive in the mail after your visit with us. Thank you!             Your Updated Medication List - Protect others around you: Learn how to safely use, store and throw away  your medicines at www.disposemymeds.org.          This list is accurate as of: 12/14/17  3:50 PM.  Always use your most recent med list.                   Brand Name Dispense Instructions for use Diagnosis    aspirin-acetaminophen-caffeine 250-250-65 MG per tablet    EXCEDRIN MIGRAINE     Take 1 tablet by mouth every 6 hours as needed for headaches Reported on 5/18/2017        azithromycin 250 MG tablet    ZITHROMAX    6 tablet    Two tablets first day, then one tablet daily for four days.    Chills       cyclobenzaprine 10 MG tablet    FLEXERIL     TAKE ONE-HALF TO ONE TABLET BY MOUTH THREE TIMES A DAY AS NEEDED FOR MUSCLE SPASM        ibuprofen 200 MG tablet    ADVIL/MOTRIN    1 tablet    Take 4 tablets (800 mg) by mouth every 8 hours as needed for mild pain    Bilateral low back pain, with sciatica presence unspecified       meloxicam 15 MG tablet    MOBIC    30 tablet    Take 1 tablet (15 mg) by mouth daily    Myofascial muscle pain, Lumbosacral spondylosis without myelopathy       tiZANidine 4 MG tablet    ZANAFLEX    60 tablet    Take 1 tablet (4 mg) by mouth 2 times daily as needed for muscle spasms    Myofascial muscle pain

## 2017-12-21 ENCOUNTER — MYC MEDICAL ADVICE (OUTPATIENT)
Dept: PALLIATIVE MEDICINE | Facility: CLINIC | Age: 32
End: 2017-12-21

## 2017-12-21 NOTE — TELEPHONE ENCOUNTER
Will route to Shirley Chatman, PT as FYI. Appointment for today cancelled at pt's request.     DEMI HackettN, RN-BC  Patient Care Supervisor/Care Coordinator  [facility]

## 2017-12-22 ENCOUNTER — RADIOLOGY INJECTION OFFICE VISIT (OUTPATIENT)
Dept: PALLIATIVE MEDICINE | Facility: CLINIC | Age: 32
End: 2017-12-22
Payer: COMMERCIAL

## 2017-12-22 ENCOUNTER — RADIANT APPOINTMENT (OUTPATIENT)
Dept: RADIOLOGY | Facility: CLINIC | Age: 32
End: 2017-12-22
Attending: PAIN MEDICINE
Payer: COMMERCIAL

## 2017-12-22 ENCOUNTER — TELEPHONE (OUTPATIENT)
Dept: PALLIATIVE MEDICINE | Facility: CLINIC | Age: 32
End: 2017-12-22

## 2017-12-22 VITALS — HEART RATE: 67 BPM | OXYGEN SATURATION: 100 % | DIASTOLIC BLOOD PRESSURE: 90 MMHG | SYSTOLIC BLOOD PRESSURE: 127 MMHG

## 2017-12-22 DIAGNOSIS — M47.817 SPONDYLOSIS OF LUMBOSACRAL REGION WITHOUT MYELOPATHY OR RADICULOPATHY: ICD-10-CM

## 2017-12-22 DIAGNOSIS — Z79.891 ENCOUNTER FOR LONG-TERM OPIATE ANALGESIC USE: ICD-10-CM

## 2017-12-22 DIAGNOSIS — M47.817 LUMBOSACRAL SPONDYLOSIS WITHOUT MYELOPATHY: Primary | ICD-10-CM

## 2017-12-22 DIAGNOSIS — M47.817 LUMBOSACRAL SPONDYLOSIS WITHOUT MYELOPATHY: ICD-10-CM

## 2017-12-22 PROCEDURE — 64493 INJ PARAVERT F JNT L/S 1 LEV: CPT | Mod: 50 | Performed by: PAIN MEDICINE

## 2017-12-22 PROCEDURE — 64494 INJ PARAVERT F JNT L/S 2 LEV: CPT | Mod: 50 | Performed by: PAIN MEDICINE

## 2017-12-22 PROCEDURE — 99000 SPECIMEN HANDLING OFFICE-LAB: CPT | Performed by: PAIN MEDICINE

## 2017-12-22 PROCEDURE — 80307 DRUG TEST PRSMV CHEM ANLYZR: CPT | Mod: 90 | Performed by: PAIN MEDICINE

## 2017-12-22 RX ORDER — TRAMADOL HYDROCHLORIDE 50 MG/1
50 TABLET ORAL DAILY PRN
Qty: 30 TABLET | Refills: 0 | Status: SHIPPED | OUTPATIENT
Start: 2017-12-22 | End: 2018-01-21

## 2017-12-22 ASSESSMENT — PAIN SCALES - GENERAL: PAINLEVEL: EXTREME PAIN (8)

## 2017-12-22 NOTE — NURSING NOTE
Discharge Information    IV Discontiued Time:  NA    Amount of Fluid Infused:  NA    Discharge Criteria = When patient returns to baseline or as per MD order    Consciousness:  Pt is fully awake    Circulation:  BP +/- 20% of pre-procedure level    Respiration:  Patient is able to breathe deeply    O2 Sat:  Patient is able to maintain O2 Sat >92% on room air    Activity:  Moves 4 extremities on command    Ambulation:  Patient is able to stand and walk or stand and pivot into wheelchair    Dressing:  Clean/dry or No Dressing    Notes:   Discharge instructions and AVS given to patient    Patient meets criteria for discharge?  YES    Admitted to PCU?  No    Responsible adult present to accompany patient home?  Yes    Sent down for Urine drug screen.     Signature/Title:    Fiorella Martin RN Care Coordinator  [facility]

## 2017-12-22 NOTE — TELEPHONE ENCOUNTER
Received call from Connie at [facility] stating that the patient came in and he is suppose to have a Tramadol rx there to fill. Pharmacy has the rx for Diclofenac, but not Tramadol. Please call to verify rx. Phone # [phone]    Amber Quezada    [facility] the [facility]

## 2017-12-22 NOTE — PATIENT INSTRUCTIONS
[facility]   Medial Branch Block Discharge Instructions      Your procedure was performed by:   Dr. Gal Kahn    Medications used include:  Lidocaine  Bupivicaine     You will need to complete the Pain Scale Log form and return it to us as soon as possible.  Once we have received the form, we will review it and call you to determine the next steps.     The form can be faxed to [phone] or mailed to:   [facility]   [address]   [place] [address]      You may resume your regular medications    You may resume your regular activities    Be cautious with walking as numbness and/or weakness in the lower extremities may occur for up to 6-8 hours due to the effects of the anesthetic.    Avoid driving for 6 hours. The local anesthetic could slow your reflexes.     You may shower, however no swimming or tub baths or hot tubs for 24 hours following your procedure.    Your pain will return after the numbing medications have worn off.  You may use your current pain medications as needed.    You may use anti-inflammatory medications (such as ibuprofen, aleve, or advil) or Tylenol for pain control if necessary.  Some people find it helpful to alternate ibuprofen and Tylenol every 3 hours for a couple of days.    You may use ice packs 10-15 minutes three to four times a day at the injection site for comfort.     Do not use heat to painful areas for 6 to 8 hours. This will give the local anesthetic time to wear off and prevent you from accidentally burning your skin.     If you experience any of the following, call the pain center nursing line during work hours at [phone] or the after hours provider line at [phone]:  -Fever over 100 degree F  -Swelling, bleeding, redness, drainage, warmth at the injection site  -Progressive weakness or numbness in your legs   -If lumbar, call if you have a loss of bowel or bladder function  -Unusual new onset of pain  that is not improving

## 2017-12-22 NOTE — MR AVS SNAPSHOT
After Visit Summary   12/22/2017    Moris FARIA [place]    MRN: [id number]           Patient Information     Date Of Birth          1985        Visit Information        Provider Department      12/22/2017 9:15 AM Gal Kahn MD [facility]'s Diagnoses     Lumbosacral spondylosis without myelopathy    -  1    Encounter for long-term opiate analgesic use          Care Instructions    [facility]   Medial Branch Block Discharge Instructions      Your procedure was performed by:   Dr. Gal Kahn    Medications used include:  Lidocaine  Bupivicaine     You will need to complete the Pain Scale Log form and return it to us as soon as possible.  Once we have received the form, we will review it and call you to determine the next steps.     The form can be faxed to [phone] or mailed to:   [facility] [address]   [place] [address]      You may resume your regular medications    You may resume your regular activities    Be cautious with walking as numbness and/or weakness in the lower extremities may occur for up to 6-8 hours due to the effects of the anesthetic.    Avoid driving for 6 hours. The local anesthetic could slow your reflexes.     You may shower, however no swimming or tub baths or hot tubs for 24 hours following your procedure.    Your pain will return after the numbing medications have worn off.  You may use your current pain medications as needed.    You may use anti-inflammatory medications (such as ibuprofen, aleve, or advil) or Tylenol for pain control if necessary.  Some people find it helpful to alternate ibuprofen and Tylenol every 3 hours for a couple of days.    You may use ice packs 10-15 minutes three to four times a day at the injection site for comfort.     Do not use heat to painful areas for 6 to 8 hours. This will give the local anesthetic time to wear off and prevent  you from accidentally burning your skin.     If you experience any of the following, call the pain center nursing line during work hours at [phone] or the after hours provider line at [phone]:  -Fever over 100 degree F  -Swelling, bleeding, redness, drainage, warmth at the injection site  -Progressive weakness or numbness in your legs   -If lumbar, call if you have a loss of bowel or bladder function  -Unusual new onset of pain that is not improving            Follow-ups after your visit        Your next 10 appointments already scheduled     Dec 28, 2017  2:30 PM CST   Return Visit with Cleo Chatman, PT   [facility] [place] ([facility])    [address]  [place] [address] [phone]              Who to contact     If you have questions or need follow up information about today's clinic visit or your schedule please contact [facility] directly at [phone].  Normal or non-critical lab and imaging results will be communicated to you by [employer], letter or phone within 4 business days after the clinic has received the results. If you do not hear from us within 7 days, please contact the clinic through [employer] or phone. If you have a critical or abnormal lab result, we will notify you by phone as soon as possible.  Submit refill requests through [email or web address] or call your pharmacy and they will forward the refill request to us. Please allow 3 business days for your refill to be completed.          Additional Information About Your Visit        [employer]otion Information     [email or web address] gives you secure access to your electronic health record. If you see a primary care provider, you can also send messages to your care team and make appointments. If you have questions, please call your primary care clinic.  If you do not have a primary care provider, please call [phone] and they will assist you.        Care EveryWhere ID     This is your Care EveryWhere ID.  This could be used by other organizations to access your [place] medical records  [id number]        Your Vitals Were     Pulse                   68            Blood Pressure from Last 3 Encounters:   12/22/17 130/85   12/04/17 130/85   11/22/17 162/88    Weight from Last 3 Encounters:   11/22/17 117 kg (258 lb)   10/11/17 114.9 kg (253 lb 3.2 oz)   10/06/17 115.8 kg (255 lb 6.4 oz)              We Performed the Following     Drug Screen Comprehensive , Urine with Reported Meds (Pain Care Package)          Today's Medication Changes          These changes are accurate as of: 12/22/17  9:43 AM.  If you have any questions, ask your nurse or doctor.               Start taking these medicines.        Dose/Directions    diclofenac 50 MG EC tablet   Commonly known as:  VOLTAREN   Used for:  Lumbosacral spondylosis without myelopathy   Started by:  Gal Kahn MD        Dose:  50 mg   Take 1 tablet (50 mg) by mouth 3 times daily as needed for moderate pain   Quantity:  90 tablet   Refills:  1         Stop taking these medicines if you haven't already. Please contact your care team if you have questions.     meloxicam 15 MG tablet   Commonly known as:  MOBIC   Stopped by:  Gal Kahn MD                Where to get your medicines      These medications were sent to [id number] - [place], [place] - [address]  [address] [address] [facility] [address]     Phone:  [phone]     diclofenac 50 MG EC tablet                Primary Care Provider Office Phone # Fax #    Yaritza Castorena [phone]       [address] [place] [address]        Equal Access to Services     [facility]: Hadii merlyn ku hadasho Soomaali, waaxda luqadaha, qaybta kaalmada adeegjaida, marcelino escobar. So [facility] [phone].    ATENCIÓN: Si habla español, tiene a martniez disposición servicios gratuitos de asistencia lingüística. Llame al [phone].    We comply with  applicable federal civil rights laws and [place] laws. We do not discriminate on the basis of race, color, national origin, age, disability, sex, sexual orientation, or gender identity.            Thank you!     Thank you for choosing [facility]  for your care. Our goal is always to provide you with excellent care. Hearing back from our patients is one way we can continue to improve our services. Please take a few minutes to complete the written survey that you may receive in the mail after your visit with us. Thank you!             Your Updated Medication List - Protect others around you: Learn how to safely use, store and throw away your medicines at www.disposemymeds.org.          This list is accurate as of: 12/22/17  9:43 AM.  Always use your most recent med list.                   Brand Name Dispense Instructions for use Diagnosis    aspirin-acetaminophen-caffeine 250-250-65 MG per tablet    EXCEDRIN MIGRAINE     Take 1 tablet by mouth every 6 hours as needed for headaches Reported on 5/18/2017        cyclobenzaprine 10 MG tablet    FLEXERIL     TAKE ONE-HALF TO ONE TABLET BY MOUTH THREE TIMES A DAY AS NEEDED FOR MUSCLE SPASM        diclofenac 50 MG EC tablet    VOLTAREN    90 tablet    Take 1 tablet (50 mg) by mouth 3 times daily as needed for moderate pain    Lumbosacral spondylosis without myelopathy       ibuprofen 200 MG tablet    ADVIL/MOTRIN    1 tablet    Take 4 tablets (800 mg) by mouth every 8 hours as needed for mild pain    Bilateral low back pain, with sciatica presence unspecified       tiZANidine 4 MG tablet    ZANAFLEX    60 tablet    Take 1 tablet (4 mg) by mouth 2 times daily as needed for muscle spasms    Myofascial muscle pain

## 2017-12-22 NOTE — NURSING NOTE
Pt left before receiving tramadol prescription so Rx was faxed to [facility]'s pharmacy. Received confirmation of successful transmission.     DEMI HackettN, RN-BC  Patient Care Supervisor/Care Coordinator  [facility]

## 2017-12-22 NOTE — NURSING NOTE
"Chief Complaint   Patient presents with     Pain       Initial /85  Pulse 68 Estimated body mass index is 35.8 kg/(m^2) as calculated from the following:    Height as of 10/6/17: 1.808 m (5' 11.18\").    Weight as of 11/22/17: 117 kg (258 lb).  Medication Reconciliation: complete     Pre-procedure Intake    Have you been fasting? NA    If yes, for how long? NA    Are you taking a prescribed blood thinner such as coumadin, Plavix, Xarelto?    No    If yes, when did you take your last dose? NA    Do you take aspirin?  No    If cervical procedure, have you held aspirin for 6 days?   NA    Do you have any allergies to contrast dye, iodine, steroid and/or numbing medications?  NO    Are you currently taking antibiotics or have an active infection?  NO    Have you had a fever/elevated temperature within the past week? NO    Are you currently taking oral steroids? NO    Do you have a ? Yes       Are you pregnant or breastfeeding?  Not Applicable    Are the vital signs normal?  Yes    Lore Newsome CMA (AAMA)          "
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
## 2017-12-22 NOTE — TELEPHONE ENCOUNTER
Prescription was faxed at 1130. Called pharmacy to verify that they had received the prescription. They did receive the prescription.     ALBERTO Hackett, RN-BC  Patient Care Supervisor/Care Coordinator  [facility]

## 2017-12-28 ENCOUNTER — OFFICE VISIT (OUTPATIENT)
Dept: PALLIATIVE MEDICINE | Facility: CLINIC | Age: 32
End: 2017-12-28
Payer: COMMERCIAL

## 2017-12-28 DIAGNOSIS — M79.18 MYOFASCIAL MUSCLE PAIN: ICD-10-CM

## 2017-12-28 DIAGNOSIS — G89.29 CHRONIC PAIN: Primary | ICD-10-CM

## 2017-12-28 DIAGNOSIS — M47.812 CERVICAL SPONDYLOSIS WITHOUT MYELOPATHY: ICD-10-CM

## 2017-12-28 DIAGNOSIS — M47.817 LUMBOSACRAL SPONDYLOSIS WITHOUT MYELOPATHY: ICD-10-CM

## 2017-12-28 PROCEDURE — 97530 THERAPEUTIC ACTIVITIES: CPT | Mod: GP | Performed by: PHYSICAL THERAPIST

## 2017-12-28 PROCEDURE — 97112 NEUROMUSCULAR REEDUCATION: CPT | Mod: GP | Performed by: PHYSICAL THERAPIST

## 2017-12-28 PROCEDURE — 97110 THERAPEUTIC EXERCISES: CPT | Mod: GP | Performed by: PHYSICAL THERAPIST

## 2017-12-28 NOTE — MR AVS SNAPSHOT
After Visit Summary   12/28/2017    Moris FARIA [place]    MRN: [id number]           Patient Information     Date Of Birth          1985        Visit Information        Provider Department      12/28/2017 2:30 PM Cleo Chatman, PT [facility]        Today's Diagnoses     Chronic pain    -  1    Lumbosacral spondylosis without myelopathy        Myofascial muscle pain        Cervical spondylosis without myelopathy           Follow-ups after your visit        Your next 10 appointments already scheduled     Jan 04, 2018  3:15 PM CST   Return Visit with Cleo Chatman PT   [facility] ([facility])    [address] [address] [facility] [id number] [phone]            Jan 11, 2018  2:30 PM CST   Return Visit with Cleo Chatman PT   [facility] ([facility])    [address] [id number] [phone]              Who to contact     If you have questions or need follow up information about today's clinic visit or your schedule please contact [facility] directly at [phone].  Normal or non-critical lab and imaging results will be communicated to you by Identivhart, letter or phone within 4 business days after the clinic has received the results. If you do not hear from us within 7 days, please contact the clinic through Identivhart or phone. If you have a critical or abnormal lab result, we will notify you by phone as soon as possible.  Submit refill requests through [employer] or call your pharmacy and they will forward the refill request to us. Please allow 3 business days for your refill to be completed.          Additional Information About Your Visit        Identivhart Information     [employer] gives you secure access to your electronic health record. If you see a primary care provider, you can also send messages to your care team and make appointments. If you have questions,  please call your primary care clinic.  If you do not have a primary care provider, please call [phone] and they will assist you.        Care EveryWhere ID     This is your Care EveryWhere ID. This could be used by other organizations to access your [facility] medical records  [id number]         Blood Pressure from Last 3 Encounters:   12/22/17 127/90   12/04/17 130/85   11/22/17 162/88    Weight from Last 3 Encounters:   11/22/17 117 kg (258 lb)   10/11/17 114.9 kg (253 lb 3.2 oz)   10/06/17 115.8 kg (255 lb 6.4 oz)              We Performed the Following     NEUROMUSCULAR RE-EDUCATION     THERAPEUTIC ACTIVITIES     THERAPEUTIC EXERCISES        Primary Care Provider Office Phone # Fax #    Yaritza Castorena [phone]       [address] [place] [address]        Equal Access to Services     [facility]: Hadii aad ku hadasho Soomaali, waaxda luqadaha, qaybta kaalmada adeegyada, waxay yayain hayderickn adeolamide lea . So [facility] [phone].    ATENCIÓN: Si habla español, tiene a martinez disposición servicios gratuitos de asistencia lingüística. Llame al [phone].    We comply with applicable federal civil rights laws and [place] laws. We do not discriminate on the basis of race, color, national origin, age, disability, sex, sexual orientation, or gender identity.            Thank you!     Thank you for choosing [facility]  for your care. Our goal is always to provide you with excellent care. Hearing back from our patients is one way we can continue to improve our services. Please take a few minutes to complete the written survey that you may receive in the mail after your visit with us. Thank you!             Your Updated Medication List - Protect others around you: Learn how to safely use, store and throw away your medicines at www.disposemymeds.org.          This list is accurate as of: 12/28/17  3:40 PM.  Always use your most recent med list.                   Brand Name  Dispense Instructions for use Diagnosis    aspirin-acetaminophen-caffeine 250-250-65 MG per tablet    EXCEDRIN MIGRAINE     Take 1 tablet by mouth every 6 hours as needed for headaches Reported on 5/18/2017        cyclobenzaprine 10 MG tablet    FLEXERIL     TAKE ONE-HALF TO ONE TABLET BY MOUTH THREE TIMES A DAY AS NEEDED FOR MUSCLE SPASM        diclofenac 50 MG EC tablet    VOLTAREN    90 tablet    Take 1 tablet (50 mg) by mouth 3 times daily as needed for moderate pain    Lumbosacral spondylosis without myelopathy       ibuprofen 200 MG tablet    ADVIL/MOTRIN    1 tablet    Take 4 tablets (800 mg) by mouth every 8 hours as needed for mild pain    Bilateral low back pain, with sciatica presence unspecified       tiZANidine 4 MG tablet    ZANAFLEX    60 tablet    Take 1 tablet (4 mg) by mouth 2 times daily as needed for muscle spasms    Myofascial muscle pain       traMADol 50 MG tablet    ULTRAM    30 tablet    Take 1 tablet (50 mg) by mouth daily as needed for severe pain maximum 1 tablet(s) per day    Lumbosacral spondylosis without myelopathy

## 2017-12-28 NOTE — PROGRESS NOTES
"Pain Physical Therapy Progress Note    Patient Name: Moris Bowers     YOB: 1985     Medical Record Number: [id number]    Visits: 3/10    Diagnosis:    Chronic pain  Lumbosacral spondylosis without myelopathy  Myofascial muscle pain  Cervical spondylosis without myelopathy    Subjective: Patient reports that the second block worked well; did overdo it with the block--increased pain afterwards.     Brought in pain diary; place in Pain Management mailbox    Has been on vacation for a few days--has been able to be more \"low key--not so much running around at the airport\". Has noticed slight decrease in pain    Pain is about 6-7/10; normally at 8-9/10 when at work    Self Care  HEP: trying to do stretches regularly  Walking/Aerobic Activity: hasn't been as active when not at work--does a lot of walking there  Heat/Ice: NA  Posture: more aware of it; it's a struggle to do regularly; wife helping out with reminders, has changed position of car seat for commuting  Mini Breaks: NA  Breathing/Relaxation: NA  Pacing: NA    Objective Findings:    OBSERVATION: rounded shoulders, forward head, clenching jaw; fair core engagement in sitting, upper chest breathing  GAIT & LOCOMOTION: non-antalgic     Treatment Interventions:  Therapeutic Procedures/Exercises: instructed on stretches for upper trap, levator scap; towel roll thoracic stretch in supine; gave handouts.  Instructed on single arm pect stretch--patient decided to hold on it--\"I think I'll over do it at home\"  Discussed importance of slow; gentle stretches, not overdoing it, not causing pain with stretches     Therapeutic Activity:   Discussed basics of self care for jaw--self massage of masseter--can use fingers inside mouth and outside of jaw; scalp massage--did get \"scalp massager\" for Agatha; discussed cutting back on cracking knuckles/back/neck and what actually is happening in joints    Neuromuscular Reeducation:   Reviewed posture basics; " will take time; working on neutral posture; basic alignment of ear, shoulder, and hip  __________________________________________________________________    Instruction on home program: HEP    Assessment:    Patient tolerated/responded well to treatment    Impression/Recommendations: continue    Intensity Level: 2 (1=low intensity; 5=high intensity)    Demonstrates/Verbalizes Technique: 2, 3 (1= poor technique-difficulty performing exercises,significant cues required; 5= good technique-performs exercises without cues)    Body Awareness: 2, 3 (1=low awareness; 5=high awareness)    Posture/Stability: 2, 3 (1= poor posture, stability; 5= good posture, stability)    Motivational Level: Cooperative    Response to Teaching: cooperative    Factors that affect learning: None    _______________________________________________________________________  Plan of Care   Continue PT to support reactivation and integration of self regulation pain management skills;  Focus of next session will be on: self care; breathing     Present:  RICHARD     Total Visit Time:  45 minutes  TA: 15 mins; Neuro-Re Ed: 10 mins; TE: 20 mins      Therapist: Cleo Chatman PT             Date: 12/28/2017

## 2017-12-29 ENCOUNTER — TELEPHONE (OUTPATIENT)
Dept: PALLIATIVE MEDICINE | Facility: CLINIC | Age: 32
End: 2017-12-29

## 2017-12-29 NOTE — TELEPHONE ENCOUNTER
Called patient to see how he was doing after the 2nd LMBB. He said that he did great and had good pain relief after the injection and he would like to move forward with the approval for the RFA. He came and physically returned his post MBB form to us on 12/28/17.  He would like for us to send to the  for RFA approval and call him when it is ready to schedule.    He said that he is waiting still for a RX from Dr. Kahn. If a nurse could look into this and call him back it would be appreciated.    Sachi Laws RT (R)

## 2017-12-30 LAB — PAIN DRUG SCR UR W RPTD MEDS: NORMAL

## 2018-01-02 NOTE — TELEPHONE ENCOUNTER
Called pharmacy. Verified that script is waiting for patient to . Called patient and LM that he can  his script and that provider is out of the office until tomorrow. Provider will determine if ok to proceed with RFA/we will begin PA process at that time.     Ginette SMIMSN-RN Care Coordinator  [facility]

## 2018-01-03 ENCOUNTER — MYC MEDICAL ADVICE (OUTPATIENT)
Dept: OTHER | Age: 33
End: 2018-01-03

## 2018-01-03 NOTE — TELEPHONE ENCOUNTER
Pt had a bilateral lumbar medial branch block # 2 on 12/22/17.  The post medial branch block form was   received.  Max % of pain relief from blocks:    Bilateral lumbar medial branch block #1:    At rest:                    100% for hour 1-8  Right facet load:        100% for hour 1-8  Left facet load:        100% for hour 1-8  Normal activity:       89% for hour 1, then 100% hour 2-8     Max relief from block #2 is:    At rest:                 100%  Left fact load:       100%  Right facet load:  100%  Normal activity:    100%    Average pain relief from block #2:  100      Insurance:  P1 per last intake questions    Does pt have adequate relief insurance-wise to proceed to radiofrequency ablation?  Yes:      Physical therapy:  Last done in?:  current. How many sessions?:  3 with 2 more scheduled for 1/4/18 and 1/11/18.  Where was it          done?  [facility] with Cleo Chatman, PT.  Insurance to be checked after pt done w/ PT.      The pain diary was faxed to Suzanne for insurance review.    Routed to Suzanne to check insurance coverage.  Dx Facet arthropathy 721.9. For medicare patients use 716.98.    Mychart sent to pt:    1/3/18 10:14 AM   Arjun Niño.   We received your pain diary.   Your insurance requires that you finish PT. Once you are done we will submit your information to you insurance to see if they will cover the radiofrequency ablation.     Jennifer Avalos RN-BSN   [facility]

## 2018-01-04 ENCOUNTER — OFFICE VISIT (OUTPATIENT)
Dept: PALLIATIVE MEDICINE | Facility: CLINIC | Age: 33
End: 2018-01-04
Payer: COMMERCIAL

## 2018-01-04 DIAGNOSIS — M47.817 LUMBOSACRAL SPONDYLOSIS WITHOUT MYELOPATHY: ICD-10-CM

## 2018-01-04 DIAGNOSIS — M47.812 CERVICAL SPONDYLOSIS WITHOUT MYELOPATHY: ICD-10-CM

## 2018-01-04 DIAGNOSIS — G89.29 CHRONIC PAIN: Primary | ICD-10-CM

## 2018-01-04 DIAGNOSIS — M79.18 MYOFASCIAL MUSCLE PAIN: ICD-10-CM

## 2018-01-04 PROCEDURE — 97530 THERAPEUTIC ACTIVITIES: CPT | Mod: GP | Performed by: PHYSICAL THERAPIST

## 2018-01-04 PROCEDURE — 97110 THERAPEUTIC EXERCISES: CPT | Mod: GP | Performed by: PHYSICAL THERAPIST

## 2018-01-04 NOTE — MR AVS SNAPSHOT
After Visit Summary   1/4/2018    Moris GIANA Bowers    MRN: [id number]           Patient Information     Date Of Birth          1985        Visit Information        Provider Department      1/4/2018 3:15 PM Cleo Chatman, LUIS [facility]        Today's Diagnoses     Chronic pain    -  1    Lumbosacral spondylosis without myelopathy        Myofascial muscle pain        Cervical spondylosis without myelopathy           Follow-ups after your visit        Your next 10 appointments already scheduled     Jan 11, 2018  2:30 PM CST   Return Visit with Cleo Chatman PT   [facility])    [address]  [place] [id number] [phone]              Who to contact     If you have questions or need follow up information about today's clinic visit or your schedule please contact [facility] directly at [phone].  Normal or non-critical lab and imaging results will be communicated to you by MyChart, letter or phone within 4 business days after the clinic has received the results. If you do not hear from us within 7 days, please contact the clinic through [employer] or phone. If you have a critical or abnormal lab result, we will notify you by phone as soon as possible.  Submit refill requests through [employer] or call your pharmacy and they will forward the refill request to us. Please allow 3 business days for your refill to be completed.          Additional Information About Your Visit        MyChart Information     [employer] gives you secure access to your electronic health record. If you see a primary care provider, you can also send messages to your care team and make appointments. If you have questions, please call your primary care clinic.  If you do not have a primary care provider, please call [phone] and they will assist you.        Care EveryWhere ID     This is your Care EveryWhere ID. This could be  used by other organizations to access your [place] medical records  [id number]         Blood Pressure from Last 3 Encounters:   12/22/17 127/90   12/04/17 130/85   11/22/17 162/88    Weight from Last 3 Encounters:   11/22/17 117 kg (258 lb)   10/11/17 114.9 kg (253 lb 3.2 oz)   10/06/17 115.8 kg (255 lb 6.4 oz)              We Performed the Following     THERAPEUTIC ACTIVITIES     THERAPEUTIC EXERCISES        Primary Care Provider Office Phone # Fax #    Yaritza Savtia Castorena [phone]       [address] [facility] [address]        Equal Access to Services     [facility] : Hadii merlyn corleyo Dwayne, waaxda luqadaha, qaybta kaalmada radu, marcelino escobar. So [facility] [phone].    ATENCIÓN: Si habla español, tiene a martinez disposición servicios gratuitos de asistencia lingüística. [facility] [phone].    We comply with applicable federal civil rights laws and [place] laws. We do not discriminate on the basis of race, color, national origin, age, disability, sex, sexual orientation, or gender identity.            Thank you!     Thank you for choosing [facility]  for your care. Our goal is always to provide you with excellent care. Hearing back from our patients is one way we can continue to improve our services. Please take a few minutes to complete the written survey that you may receive in the mail after your visit with us. Thank you!             Your Updated Medication List - Protect others around you: Learn how to safely use, store and throw away your medicines at www.disposemymeds.org.          This list is accurate as of: 1/4/18  3:54 PM.  Always use your most recent med list.                   Brand Name Dispense Instructions for use Diagnosis    aspirin-acetaminophen-caffeine 250-250-65 MG per tablet    EXCEDRIN MIGRAINE     Take 1 tablet by mouth every 6 hours as needed for headaches Reported on 5/18/2017        cyclobenzaprine 10 MG tablet     FLEXERIL     TAKE ONE-HALF TO ONE TABLET BY MOUTH THREE TIMES A DAY AS NEEDED FOR MUSCLE SPASM        diclofenac 50 MG EC tablet    VOLTAREN    90 tablet    Take 1 tablet (50 mg) by mouth 3 times daily as needed for moderate pain    Lumbosacral spondylosis without myelopathy       ibuprofen 200 MG tablet    ADVIL/MOTRIN    1 tablet    Take 4 tablets (800 mg) by mouth every 8 hours as needed for mild pain    Bilateral low back pain, with sciatica presence unspecified       tiZANidine 4 MG tablet    ZANAFLEX    60 tablet    Take 1 tablet (4 mg) by mouth 2 times daily as needed for muscle spasms    Myofascial muscle pain       traMADol 50 MG tablet    ULTRAM    30 tablet    Take 1 tablet (50 mg) by mouth daily as needed for severe pain maximum 1 tablet(s) per day    Lumbosacral spondylosis without myelopathy

## 2018-01-04 NOTE — PROGRESS NOTES
Pain Physical Therapy Progress Note    Patient Name: Moris Bowers     YOB: 1985     Medical Record Number: [id number]    Visits: 4/10    Diagnosis:    Chronic pain  Lumbosacral spondylosis without myelopathy  Myofascial muscle pain  Cervical spondylosis without myelopathy    Subjective: Patient reports that he is feeling good, working on the neck stretches--feeling looser; pain behind ear better.    Looking forward to getting insurance approval for RFA    Self Care  HEP: trying to do stretches regularly, realized that he overdo the neck ones the first few days  Walking/Aerobic Activity: walking a lot at work--30,000 steps today  Heat/Ice: NA  Posture: more aware; wife helping with reminders, shoulders are staying back more; don't feel like I'm slouching as much  Mini Breaks: NA  Breathing/Relaxation: NA  Pacing: NA    Objective Findings:  OBSERVATION: slightly rounded shoulders, slight forward head, clenching jaw at times, able to self correct posture with minimal cueing; fair core engagement in sitting, upper chest breathing  GAIT & LOCOMOTION: non-antalgic       Treatment Interventions:  Therapeutic Procedures/Exercises: reviewed importance of not overdoing it with stretches; start small/gentle, can always do more--once overdoing it--can't take it back.  Instructed in thoracic stretch with shoulder ER--having pain with thoracic stretch with towel roll--having LEs out straight--will try again at home; pirifomis stretch--supine and sitting, hip flexor EOB and FOB and standing--prefers standing; IT band stretch--standing and supine with belt--prefers supine with belt.  Gave handouts     Therapeutic Activity:   Discussed self care for jaw; self face massage; cheek puffs; car and seat positioning, adjusting seat/steering wheel, use of lumbar support--pillow/blanket, etc  Discussed sleeping positions, use of pillows when on  side  __________________________________________________________________    Instruction on home program: HEP, self care    Assessment:    Patient tolerated/responded well to treatment    Impression/Recommendations: continue; will send message to  re: PT visits and RFA insurance approval    Intensity Level: 2, 3 (1=low intensity; 5=high intensity)    Demonstrates/Verbalizes Technique: 2, 3 (1= poor technique-difficulty performing exercises,significant cues required; 5= good technique-performs exercises without cues)    Body Awareness: 3 (1=low awareness; 5=high awareness)    Posture/Stability: 2, 3 (1= poor posture, stability; 5= good posture, stability)    Motivational Level: Cooperative    Response to Teaching: cooperative    Factors that affect learning: None    _______________________________________________________________________  Plan of Care   Continue PT to support reactivation and integration of self regulation pain management skills;  Focus of next session will be on: self care, mini breaks, HEP progression     Present:  RICHARD     Total Visit Time:  50 minutes  TA: 20 mins; TE: 30 mins      Therapist: Cleo Chatman PT             Date: 1/4/2018

## 2018-01-16 NOTE — TELEPHONE ENCOUNTER
Pre-screening Questions for RFA Procedure      Procedure ordered? LRFA    What insurance are we billing for this procedure?  AETNA    Has patient had this injection before? No  Any chance of pregnancy? Not Applicable   If YES, do NOT schedule and route to RN pool    Is  Needed?: No  Will patient have a ?  Yes   If pt is given sedation meds, no driving for 24 hours.  Is pt taking a cab or transportation service? NO        If so will need to be accompanied by an adult too (friend/family member) in order for IV sedation to be given.      Per [facility] Policy:  Outpatients are to have responsible adult or family member to accompany them at discharge and drive them home. A service providing medically trained drivers or attendants would be acceptable. Public transportation would not be acceptable unless the patient is accompanied by a responsible adult or family member.    Is patient taking any blood thinners (plavix, coumadin, jantoven, warfarin, heparin, pradaxa or dabigatran )? No   If YES, do NOT schedule, and route to RN pool    Is patient taking any aspirin products? No     If more than 325mg/day do NOT schedule and route to RN pool     For CERVICAL procedures, hold all aspirin products for 6 days.     Does the patient have a bleeding or clotting disorder? No     If YES, it it OKAY to schedule AND route to RN pool    **For any patients with platelet count <100, must be forwarded to provider**    Does patient have an active infection or treated for one within the past week? No   If YES, do NOT schedule and route to RN nurse pool     Is patient currently taking any antibiotics?  No    For patients on chronic, preventative, or prophylactic antibiotics, procedures may be scheduled.     For patients on antibiotics for active or recent infection:    Yeni Rico Burton, Snitzer-antibiotic course must have been completed for 4 days    Lashawn Orr-antibiotic course must have been completed for 7  days    Is patient currently taking any steroid medications? (i.e. Prednisone, Medrol)  No     For patients on steroid medications:    Lashawn Conteh, Matthew Jaime Snitzer-steroid course must have been completed for 4 days    Dr. Orr-steroid course must have been completed for 7 days    Reviewed with patient:  If you are started on any steroids or antibiotics between now and your appointment, you must contact us because it may affect our ability to perform your procedure.  Yes    Is patient actively being treated for cancer or immunocompromised, including the spleen having been removed? No  If YES, do NOT schedule and route to RN pool     Any history of complications with sedation medications?  NO   If YES, OK to schedule AND route to RN pool     Any history of sleep apnea?  YES   If YES, OK to schedule AND route to RN pool     Any cardiac history?  NO   If YES, OK to schedule AND route to RN pool     Do you have an implanted pacemaker, ICD (implanted cardiac device) or AICD (automatic implanted cardiac device)?  NO    If YES, do NOT schedule AND route to RN pool.     Obtain name of device :       Obtain name of cardiologist:       Do you have an implanted stimulator?  NO    If YES, OK to schedule AND route to nursing.     Instruct patient to bring in the remote to the appointment and it will need to be turned off.        Does patient have an allergy to contrast dye, iodine or shellfish?  No   If YES, OK to schedule. Route to RN pool AND add allergy information to appointment notes    Are you able to get on and off an exam table with minimal or no assistance? Yes   If NO, do NOT schedule and route to RN pool    Are you able to roll over and lay on your stomach with minimal or no assistance? Yes   If NO, do NOT schedule and route to RN pool    Informed patient that s/he needs to be NPO for 6 hours before procedure?  YES   Informed patient that it is OK to take normal medications with sips of water,  especially blood pressure medications, before the procedure and must hold blood thinners as instructed.  Yes  Informed patient to arrive 30 minutes before procedure time to have an IV inserted.  reviewed   Do NOT schedule at 0745, 0815 or 1245.  reviewed   All radiofrequency ablations are in a 90 minute time slot except genicular radiofrequency ablation those are 60 minute time slot.  reviewed     When you schedule an RFA for [facility], send an e-mail to Ginette Harding RN, Erica Christian RN and Amber Avalos RT with the type of RFA (cervical, lumbar, etc), provider, scheduled date and time of the procedure, and location     In [place] there are only 6 probes for each area (lumbar and cervical) with a 72 hour autoclave.  Genicular and TON RFA s use the cervical probes.  Make sure that there is at least 1 day between LRFA s and 1 day between procedures needing cervical probes.

## 2018-01-16 NOTE — TELEPHONE ENCOUNTER
Called and spoke to Chasidy at [facility], she states there is no PA required for Lumbar RFA.      Suzanne YOON    [facility]

## 2018-02-15 ENCOUNTER — RADIANT APPOINTMENT (OUTPATIENT)
Dept: RADIOLOGY | Facility: CLINIC | Age: 33
End: 2018-02-15
Attending: PAIN MEDICINE
Payer: COMMERCIAL

## 2018-02-15 ENCOUNTER — RADIOLOGY INJECTION OFFICE VISIT (OUTPATIENT)
Dept: PALLIATIVE MEDICINE | Facility: CLINIC | Age: 33
End: 2018-02-15
Payer: COMMERCIAL

## 2018-02-15 VITALS
SYSTOLIC BLOOD PRESSURE: 147 MMHG | HEART RATE: 75 BPM | OXYGEN SATURATION: 97 % | DIASTOLIC BLOOD PRESSURE: 101 MMHG | RESPIRATION RATE: 20 BRPM

## 2018-02-15 DIAGNOSIS — M47.816 SPONDYLOSIS OF LUMBAR REGION WITHOUT MYELOPATHY OR RADICULOPATHY: ICD-10-CM

## 2018-02-15 DIAGNOSIS — M47.817 LUMBOSACRAL SPONDYLOSIS WITHOUT MYELOPATHY: Primary | ICD-10-CM

## 2018-02-15 PROCEDURE — 99152 MOD SED SAME PHYS/QHP 5/>YRS: CPT | Performed by: PAIN MEDICINE

## 2018-02-15 PROCEDURE — 64635 DESTROY LUMB/SAC FACET JNT: CPT | Mod: 50 | Performed by: PAIN MEDICINE

## 2018-02-15 PROCEDURE — 64636 DESTROY L/S FACET JNT ADDL: CPT | Mod: 50 | Performed by: PAIN MEDICINE

## 2018-02-15 PROCEDURE — 99153 MOD SED SAME PHYS/QHP EA: CPT | Performed by: PAIN MEDICINE

## 2018-02-15 RX ORDER — OXYCODONE AND ACETAMINOPHEN 5; 325 MG/1; MG/1
1 TABLET ORAL EVERY 12 HOURS PRN
Qty: 10 TABLET | Refills: 0 | Status: SHIPPED | OUTPATIENT
Start: 2018-02-15 | End: 2018-02-20

## 2018-02-15 ASSESSMENT — PAIN SCALES - GENERAL: PAINLEVEL: WORST PAIN (10)

## 2018-02-15 NOTE — PATIENT INSTRUCTIONS
Anticipate pain for up to 2 weeks after this procedure.  Percocet has been prescribed to you for this.  If you pain is not manageable on this regimen call the nurse line during business hours. Call the after hour line if not.  It may take up to 4-6 weeks to receive relief from the radiofrequency ablation .  Follow up with provider in 2 months   [facility] Procedure Discharge Instructions   Nurse line: [phone]   Appt line: [phone]   If you have received sedation before/during/after your procedure, for the next 24 hours you shall not:   -Drive   -Operate machinery   -Drink alcohol   -Sign any legal documents   You may resume your normal diet   Avoid strenuous activity for the first 24 hours   Be cautious with walking as numbness and/or weakness in the lower extremities up to 6-8 hours may occur due to effect of local anesthetic   You may resume your regular activities after 24 hours   You may resume your regular medications after the procedure   You may shower, however no swimming or tub baths or hot tubs for 24 hours following your procedure   You may have discomfort for up to 2 weeks after this injection.  You may use ice packs 10-15 minutes three to four times a day at the injection site for comfort   You may use anti-inflammatory medications (such as Ibuprofen or Aleve or Advil) or Tylenol for pain control if necessary   If you experience any of the following, call the pain center nursing line during work hours at [phone] or on call physician after hours at [phone]:  -Fever over 100 degree F   -Swelling, bleeding, redness, drainage, warmth at the injection site   -Progressive weakness or numbness on your legs or arms   -Loss of bowel or bladder function   -Unusual headache that is not relieved by Tylenol   -Unusual new onset of pain that is not improving

## 2018-02-15 NOTE — NURSING NOTE
MD Time IN: 1346   Sedation start time:  1351  MD Time OUT:  1456    Medications given: fentanyl 75 mcg IV; versed 2 mg IV  Intravenous fluids were administered, normal saline 300 cc's.  Sedation Level Achieved:  Minimal sedation    Jennifer Avalos RN-BSN  [facility]

## 2018-02-15 NOTE — NURSING NOTE
22 gauge Peripheral IV inserted into right hand - attempts: 1.  Hot packing would be helpful.

## 2018-02-15 NOTE — MR AVS SNAPSHOT
After Visit Summary   2/15/2018    Moris FARIA [place]    MRN: [id number]           Patient Information     Date Of Birth          1985        Visit Information        Provider Department      2/15/2018 1:45 PM Gal Kahn MD [facility] [place]        Today's Diagnoses     Lumbosacral spondylosis without myelopathy    -  1      Care Instructions    Anticipate pain for up to 2 weeks after this procedure.  Percocet has been prescribed to you for this.  If you pain is not manageable on this regimen call the nurse line during business hours. Call the after hour line if not.  It may take up to 4-6 weeks to receive relief from the radiofrequency ablation .  Follow up with provider in 2 months   [facility] Procedure Discharge Instructions   Nurse line: [phone]   Appt line: [phone]   If you have received sedation before/during/after your procedure, for the next 24 hours you shall not:   -Drive   -Operate machinery   -Drink alcohol   -Sign any legal documents   You may resume your normal diet   Avoid strenuous activity for the first 24 hours   Be cautious with walking as numbness and/or weakness in the lower extremities up to 6-8 hours may occur due to effect of local anesthetic   You may resume your regular activities after 24 hours   You may resume your regular medications after the procedure   You may shower, however no swimming or tub baths or hot tubs for 24 hours following your procedure   You may have discomfort for up to 2 weeks after this injection.  You may use ice packs 10-15 minutes three to four times a day at the injection site for comfort   You may use anti-inflammatory medications (such as Ibuprofen or Aleve or Advil) or Tylenol for pain control if necessary   If you experience any of the following, call the pain center nursing line during work hours at [phone] or on call physician after hours at [phone]:  -Fever over 100 degree F    -Swelling, bleeding, redness, drainage, warmth at the injection site   -Progressive weakness or numbness on your legs or arms   -Loss of bowel or bladder function   -Unusual headache that is not relieved by Tylenol   -Unusual new onset of pain that is not improving            Follow-ups after your visit        Who to contact     If you have questions or need follow up information about today's clinic visit or your schedule please contact [facility] directly at [phone].  Normal or non-critical lab and imaging results will be communicated to you by [employer], letter or phone within 4 business days after the clinic has received the results. If you do not hear from us within 7 days, please contact the clinic through [employer] or phone. If you have a critical or abnormal lab result, we will notify you by phone as soon as possible.  Submit refill requests through [employer] or call your pharmacy and they will forward the refill request to us. Please allow 3 business days for your refill to be completed.          Additional Information About Your Visit        [employer] Information     [employer] gives you secure access to your electronic health record. If you see a primary care provider, you can also send messages to your care team and make appointments. If you have questions, please call your primary care clinic.  If you do not have a primary care provider, please call [phone] and they will assist you.        Care EveryWhere ID     This is your Care EveryWhere ID. This could be used by other organizations to access your [facility] medical records  [id number]        Your Vitals Were     Pulse Respirations Pulse Oximetry             75 20 97%          Blood Pressure from Last 3 Encounters:   02/15/18 (!) 147/101   12/22/17 127/90   12/04/17 130/85    Weight from Last 3 Encounters:   11/22/17 117 kg (258 lb)   10/11/17 114.9 kg (253 lb 3.2 oz)   10/06/17 115.8 kg (255 lb 6.4 oz)              Today, you had the  following     No orders found for display         Today's Medication Changes          These changes are accurate as of 2/15/18  3:09 PM.  If you have any questions, ask your nurse or doctor.               Start taking these medicines.        Dose/Directions    oxyCODONE-acetaminophen 5-325 MG per tablet   Commonly known as:  PERCOCET   Used for:  Lumbosacral spondylosis without myelopathy   Started by:  Gal Kahn MD        Dose:  1 tablet   Take 1 tablet by mouth every 12 hours as needed for moderate to severe pain   Quantity:  10 tablet   Refills:  0            Where to get your medicines      Some of these will need a paper prescription and others can be bought over the counter.  Ask your nurse if you have questions.     Bring a paper prescription for each of these medications     oxyCODONE-acetaminophen 5-325 MG per tablet                Primary Care Provider Office Phone # Fax #    Yaritza Castorena [phone]       [address] [place] [address]        Equal Access to Services     [facility]: Hadii merlyn higgins hadasho Soeliseoali, waaxda luqadaha, qaybta kaalmada adeegyada, marcelino lea . So [facility] [phone].    ATENCIÓN: Si habla español, tiene a martinez disposición servicios gratuitos de asistencia lingüística. Llame al [phone].    We comply with applicable federal civil rights laws and [place] laws. We do not discriminate on the basis of race, color, national origin, age, disability, sex, sexual orientation, or gender identity.            Thank you!     Thank you for choosing [facility]  for your care. Our goal is always to provide you with excellent care. Hearing back from our patients is one way we can continue to improve our services. Please take a few minutes to complete the written survey that you may receive in the mail after your visit with us. Thank you!             Your Updated Medication List - Protect others around you:  Learn how to safely use, store and throw away your medicines at www.disposemymeds.org.          This list is accurate as of 2/15/18  3:09 PM.  Always use your most recent med list.                   Brand Name Dispense Instructions for use Diagnosis    aspirin-acetaminophen-caffeine 250-250-65 MG per tablet    EXCEDRIN MIGRAINE     Take 1 tablet by mouth every 6 hours as needed for headaches Reported on 5/18/2017        cyclobenzaprine 10 MG tablet    FLEXERIL     TAKE ONE-HALF TO ONE TABLET BY MOUTH THREE TIMES A DAY AS NEEDED FOR MUSCLE SPASM        diclofenac 50 MG EC tablet    VOLTAREN    90 tablet    Take 1 tablet (50 mg) by mouth 3 times daily as needed for moderate pain    Lumbosacral spondylosis without myelopathy       ibuprofen 200 MG tablet    ADVIL/MOTRIN    1 tablet    Take 4 tablets (800 mg) by mouth every 8 hours as needed for mild pain    Bilateral low back pain, with sciatica presence unspecified       oxyCODONE-acetaminophen 5-325 MG per tablet    PERCOCET    10 tablet    Take 1 tablet by mouth every 12 hours as needed for moderate to severe pain    Lumbosacral spondylosis without myelopathy       tiZANidine 4 MG tablet    ZANAFLEX    60 tablet    Take 1 tablet (4 mg) by mouth 2 times daily as needed for muscle spasms    Myofascial muscle pain

## 2018-02-15 NOTE — NURSING NOTE
"Chief Complaint   Patient presents with     Pain       Initial /85  Pulse 72 Estimated body mass index is 35.8 kg/(m^2) as calculated from the following:    Height as of 10/6/17: 1.808 m (5' 11.18\").    Weight as of 11/22/17: 117 kg (258 lb).  Medication Reconciliation: complete     Pre-procedure Intake    Have you been fasting? Yes    If yes, for how long? 6 hrs     Are you taking a prescribed blood thinner such as coumadin, Plavix, Xarelto?    No    If yes, when did you take your last dose? No     Do you take aspirin?  No    If cervical procedure, have you held aspirin for 6 days?   NA    Do you have any allergies to contrast dye, iodine, steroid and/or numbing medications?  NO    Are you currently taking antibiotics or have an active infection?  NO    Have you had a fever/elevated temperature within the past week? NO    Are you currently taking oral steroids? NO    Do you have a ? Yes       Are you pregnant or breastfeeding?  Not Applicable    Are the vital signs normal?  Yes    Shamir Rendon MA            "

## 2018-02-19 ENCOUNTER — TELEPHONE (OUTPATIENT)
Dept: PALLIATIVE MEDICINE | Facility: CLINIC | Age: 33
End: 2018-02-19

## 2018-02-19 DIAGNOSIS — M47.817 LUMBOSACRAL SPONDYLOSIS WITHOUT MYELOPATHY: Primary | ICD-10-CM

## 2018-02-19 NOTE — TELEPHONE ENCOUNTER
Called pt back since provider not available in clinic. Instructed him to call the after hours line to discuss his situation with a provider. Asked what pharmacy he would like to use if provider wanted to prescribe a medication for him. Pt chose [facility] pharmacy in [place] since that is open 24 hours.     Will route to Dr. Kahn.     Tyra Whitten, DEMIN, RN-BC  Patient Care Supervisor/Care Coordinator  [facility] [place]

## 2018-02-19 NOTE — TELEPHONE ENCOUNTER
"Reviewed chart. Pt had a lumbosacral radiofrequency ablation at L4, L5, on 2/15.      Called pt. States that after the procedure on Thursday he was feeling OK but then on Saturday into Sunday, pain started to come back and it has intensified to a degree that he is not able to tolerate. States that the pain is getting worse and worse and he is unable to do anything. Let him know that it is typical for patients to have increased pain after these procedures for up to 2 weeks and that it may take up to 8 weeks to get full relief from the RFA. Pt states that this is contrary to what Dr. Kahn told him. States that he was told that there was only a 20% chance that he would have increased pain after the injection. Pt believes that there is something wrong.  Review post injection questions with patient:  Any new numbness/tingling?: Yes. Details: states that he has shooting pains behind his right leg that feels like an electrical current.  Pain goes from his hip to his foot. Feels like there is a buzzing in his leg; sometimes intensifies with \"zingers\" depending on how he moves.   Any weakness?  No  Any bowel or bladder issues?: No  Any New pain areas?: Yes, see above description of shooting pain. Patient also states that the pain he had before the procedure has intensified 10 fold or more. Describes pain as throbbing and shooting pain. States that all of the muscles around the area are pain and his spine are tense and painful.   Signs of infection? No  Fever/chills:  Yes. Details: sweating for the last 2 days; states that he is exhausted. Feels like whole body is throbbing.   Pain relief from the injection? No; felt OK after the injection when recovering but pain has been intolerable since Saturday 2/17.   Treatments:    Pt is taking diclofenac 2-3 times/day and Tizanidine 1 tab twice daily with no relief. Took the last percocet pill this morning and that did not help because the pain had become so intense.  .   Has been " alternating the ice and heat all weekend with little to no relief  Taking Tylenol alternating with Aleve; was not specific on doses but states he is getting no relief from anything.      Let him know that I would try to connect with a provider and call him back. Confirmed that he has the after hours number to call to discuss with a provider directly.      CLEVE RIBERA  RN Care Coordinator  [facility] [place]

## 2018-02-20 RX ORDER — OXYCODONE AND ACETAMINOPHEN 5; 325 MG/1; MG/1
1 TABLET ORAL EVERY 12 HOURS PRN
Qty: 12 TABLET | Refills: 0 | Status: SHIPPED | OUTPATIENT
Start: 2018-02-20 | End: 2019-04-12

## 2018-02-20 NOTE — TELEPHONE ENCOUNTER
Pt called. reiterated that it is common to have increased pain after a rfa. Discussed that Andrea comfortable giving an additional 1 wk rx for percocet. Discussed alternative of taking his adjuvant therapy.   Order placed need orly provider to sign for pickup tiffanie.

## 2018-02-20 NOTE — TELEPHONE ENCOUNTER
oxyCODONE-acetaminophen (PERCOCET) 5-325 MG per tablet  Last picked up from pharmacy on 2/5/18 per [place]     Pt last seen by prescribing provider on 12/22/17  Next appt scheduled for 4/20/18    Last urine drug screen date 12/22/17  Current opioid agreement on file (completed within the last year) No Date of opioid agreement: None    Processing (pick one)    Pt will  in clinic at [place] location      Will route to nursing pool for review and preparation of prescription(s).

## 2018-02-20 NOTE — TELEPHONE ENCOUNTER
Patient picked up signed script for PERCOCET at [facility] 2nd floor.       Shamir Rendon MA

## 2018-02-20 NOTE — TELEPHONE ENCOUNTER
Signed Prescriptions:                        Disp   Refills    oxyCODONE-acetaminophen (PERCOCET) 5-325 M*12 tab*0        Sig: Take 1 tablet by mouth every 12 hours as needed for           moderate to severe pain or pain maximum 2           tablet(s) per day  Authorizing Provider: TIFFANY WALKER    Signing for colleague who is not at this clinic site today.    Signed script given to Shamir Rendon MA at [facility] [place]    Tiffany RICARDO, RN CNP, FNP  [facility] [place]

## 2019-04-12 ENCOUNTER — OFFICE VISIT (OUTPATIENT)
Dept: FAMILY MEDICINE | Facility: OTHER | Age: 34
End: 2019-04-12
Payer: COMMERCIAL

## 2019-04-12 VITALS
DIASTOLIC BLOOD PRESSURE: 84 MMHG | SYSTOLIC BLOOD PRESSURE: 130 MMHG | BODY MASS INDEX: 33.03 KG/M2 | TEMPERATURE: 98.7 F | WEIGHT: 238 LBS | HEART RATE: 74 BPM | OXYGEN SATURATION: 98 % | RESPIRATION RATE: 16 BRPM

## 2019-04-12 DIAGNOSIS — R05.9 COUGH: Primary | ICD-10-CM

## 2019-04-12 PROCEDURE — 99213 OFFICE O/P EST LOW 20 MIN: CPT | Performed by: FAMILY MEDICINE

## 2019-04-12 ASSESSMENT — PAIN SCALES - GENERAL: PAINLEVEL: MODERATE PAIN (4)

## 2019-04-12 NOTE — PROGRESS NOTES
SUBJECTIVE:   Moris Bowers is a 33 year old male who presents to clinic today for the following health issues:      HPI  Acute Illness   Acute illness concerns:  SOB and cough  Onset: 2 weeks    Fever: YES    Chills/Sweats: YES    Headache (location?): YES    Sinus Pressure:YES    Conjunctivitis:  no    Ear Pain:   Right ear sensitive    Rhinorrhea: YES    Congestion: YES    Sore Throat: YES     Cough: YES-productive of yellow sputum, productive of green sputum, with shortness of breath, worsening over time    Wheeze: YES    Decreased Appetite: YES    Nausea: no     Vomiting: no     Diarrhea:  no     Dysuria/Freq.: no     Fatigue/Achiness: YES--  exhausted    Sick/Strep Exposure: YES     Therapies Tried and outcome:   Excedrine, Ibuprofen, Mucinex and Dayquil-  Nothing is helping    Additional history: as documented    Reviewed and updated as needed this visit by clinical staff  Allergies         Reviewed and updated as needed this visit by Provider             Patient Active Problem List   Diagnosis     Inguinal hernia     Pigmented skin lesions     Obesity     Impaired fasting glucose     Edema     Chronic pain syndrome     Chronic low back pain, unspecified back pain laterality, with sciatica presence unspecified     Tobacco abuse     Past Surgical History:   Procedure Laterality Date     BACK SURGERY       C BONE CUTTING SEGMENTED      Foot  spur     C EXPLORATION OF EPIDIDYMIS      cyst     HC KNEE SCOPE, DIAGNOSTIC  /    Arthroscopy, Knee     HC LAP,INGUINAL HERNIA REPR,INITIAL  2005    Bilateral indirect inguinal hernias.     LAPAROSCOPIC APPENDECTOMY  2011    Procedure:LAPAROSCOPIC APPENDECTOMY; Surgeon:AHMET MATTHEWS; Location: OR       Social History     Tobacco Use     Smoking status: Current Some Day Smoker     Years: 8.00     Types: e-Cigarettes or another electronic nicotine delivery system     Last attempt to quit: 2013     Years since quittin.8     Smokeless  tobacco: Former User     Tobacco comment: E-cig   Substance Use Topics     Alcohol use: Yes     Alcohol/week: 0.0 oz     Comment: rare     Family History   Problem Relation Age of Onset     Diabetes Father      Hypertension Father      Cerebrovascular Disease Father      Alcohol/Drug Father      Arthritis Father      Depression Father      Heart Disease Father      Lipids Father      Neurologic Disorder Father      Obesity Father      Cancer Father         testicular     Parkinsonism Father      Seizure Disorder Father      Breast Cancer Maternal Grandmother      Arthritis Maternal Grandmother      Depression Maternal Grandmother      Lipids Maternal Grandmother      Obesity Maternal Grandmother      Respiratory Maternal Grandmother      Parkinsonism Maternal Grandmother      Alcohol/Drug Mother      Depression Mother      Breast Cancer Mother      Arthritis Maternal Grandfather      Parkinsonism Maternal Grandfather      Alzheimer Disease Maternal Grandfather      Arthritis Paternal Grandmother      Parkinsonism Paternal Grandmother      Arthritis Paternal Grandfather      Parkinsonism Paternal Grandfather      Family History Negative Other      Diabetes Maternal Aunt      Diabetes Maternal Uncle      Cancer Maternal Aunt         female     Cancer Maternal Uncle         skin0.     Ovarian Cancer No family hx of      Prostate Cancer No family hx of      Mental Illness No family hx of      Asthma No family hx of      Known Genetic Syndrome No family hx of      Thyroid Disease No family hx of          Current Outpatient Medications   Medication Sig Dispense Refill     albuterol (PROAIR RESPICLICK) 108 (90 Base) MCG/ACT inhaler Inhale 2 puffs into the lungs every 6 hours as needed for shortness of breath / dyspnea or wheezing 1 Inhaler 0     aspirin-acetaminophen-caffeine (EXCEDRIN MIGRAINE) 250-250-65 MG per tablet Take 1 tablet by mouth every 6 hours as needed for headaches Reported on 5/18/2017       ibuprofen  (ADVIL,MOTRIN) 200 MG tablet Take 4 tablets (800 mg) by mouth every 8 hours as needed for mild pain 1 tablet      Allergies   Allergen Reactions     Bees Anaphylaxis     No Known Drug Allergies      Wellbutrin [Bupropion] Other (See Comments)     [facility]     Recent Labs   Lab Test 10/11/17  0726 06/23/15  1425 04/16/15  1417 11/20/13  1009  10/18/11  0840   LDL  --   --   --  124  --   --    HDL  --   --   --  35*  --   --    TRIG  --   --   --  80  --   --    ALT 60 104* 70  --    < >  --    CR 0.75 0.84 0.83  --    < >  --    GFRESTIMATED >90 >90  Non  GFR Calc   >90  Non  GFR Calc    --    < >  --    GFRESTBLACK >90 >90   GFR Calc   >90   GFR Calc    --    < >  --    POTASSIUM 3.9 4.2 4.0  --    < >  --    TSH 0.29*  --   --   --   --  1.91    < > = values in this interval not displayed.      BP Readings from Last 3 Encounters:   04/12/19 130/84   02/15/18 (!) 147/101   12/22/17 127/90    Wt Readings from Last 3 Encounters:   04/12/19 108 kg (238 lb)   11/22/17 117 kg (258 lb)   10/11/17 114.9 kg (253 lb 3.2 oz)                  Labs reviewed in EPIC    ROS:  Constitutional, HEENT, cardiovascular, pulmonary, GI, , musculoskeletal, neuro, skin, endocrine and psych systems are negative, except as otherwise noted.    OBJECTIVE:     /84   Pulse 74   Temp 98.7  F (37.1  C)   Resp 16   Wt 108 kg (238 lb)   SpO2 98%   BMI 33.03 kg/m    Body mass index is 33.03 kg/m .   Physical Exam   Constitutional: He is oriented to person, place, and time. He appears well-developed and well-nourished.   HENT:   Head: Normocephalic and atraumatic.   Eyes: EOM are normal.   Neck: Neck supple.   Cardiovascular: Normal rate and regular rhythm.   Pulmonary/Chest: Effort normal. No stridor. No respiratory distress. He has wheezes. He has no rales. He exhibits no tenderness.   Musculoskeletal: Normal range of motion.   Neurological: He is alert and oriented to  person, place, and time.         Diagnostic Test Results:  none     ASSESSMENT/PLAN:     Problem List Items Addressed This Visit     None      Visit Diagnoses     Cough    -  Primary    Relevant Medications    albuterol (PROAIR RESPICLICK) 108 (90 Base) MCG/ACT inhaler       acute bronchospasm likely worsened by a viral URI  Trial albuterol inhaler for symptomatic relief  Discussed home care  Reportable signs and symptoms discussed  RTC if symptoms persist or fail to improve      Ifeoma Goldstein MD  [facility]

## 2019-09-17 NOTE — PROGRESS NOTES
Tino Bowers is a 33 year old male who presents to clinic today for the following health issues:    History of Present Illness        He eats 2-3 servings of fruits and vegetables daily.He consumes 0 sweetened beverage(s) daily.  He is taking medications regularly.     Acute Illness   Acute illness concerns: cough   Onset: 1 week     Fever: YES, 102 - 103 orally checked at home.  Last checked on Sunday.      Chills/Sweats: YES,both.     Headache (location?): YES. For the last 2 days.     Sinus Pressure:YES.    Conjunctivitis:  no    Ear Pain: no    Rhinorrhea: YES.    Congestion: YES.    Sore Throat: YES.    Rashes: no     Cough: YES-productive of green sputum, worse in morning and evening.     Wheeze: Yes    Decreased Appetite: YES    Nausea: no     Vomiting: no     Diarrhea:  no     Dysuria/Freq.: no     Fatigue/Achiness: YES. fatigue only.     Sick/Strep Exposure: YES. Patient is works as a  .      Therapies Tried and outcome: Dayquil and Nyquil.     - Does not smoke cigarettes anymore, just E-cig.     Current Outpatient Medications   Medication Sig Dispense Refill     albuterol (PROAIR RESPICLICK) 108 (90 Base) MCG/ACT inhaler Inhale 2 puffs into the lungs every 4 hours as needed for shortness of breath / dyspnea or wheezing 1 each 1     aspirin-acetaminophen-caffeine (EXCEDRIN MIGRAINE) 250-250-65 MG per tablet Take 1 tablet by mouth every 6 hours as needed for headaches Reported on 5/18/2017       azithromycin (ZITHROMAX) 250 MG tablet Two tablets first day, then one tablet daily for four days. 6 tablet 0     ibuprofen (ADVIL,MOTRIN) 200 MG tablet Take 4 tablets (800 mg) by mouth every 8 hours as needed for mild pain 1 tablet      albuterol (PROAIR RESPICLICK) 108 (90 Base) MCG/ACT inhaler Inhale 2 puffs into the lungs every 6 hours as needed for shortness of breath / dyspnea or wheezing (Patient not taking: Reported on 9/18/2019) 1 Inhaler 0     Allergies  "  Allergen Reactions     Bees Anaphylaxis     No Known Drug Allergies      Wellbutrin [Bupropion] Other (See Comments)     paranoia       Reviewed and updated as needed this visit by Provider         Review of Systems   ROS COMP: Constitutional, HEENT, cardiovascular, pulmonary, gi and gu systems are negative, except as otherwise noted.      Objective    /86   Pulse 76   Temp 98.9  F (37.2  C) (Oral)   Resp 16   Ht 1.808 m (5' 11.18\")   Wt 105.5 kg (232 lb 9.6 oz)   SpO2 97%   BMI 32.28 kg/m    Body mass index is 32.28 kg/m .  Physical Exam   GENERAL: alert and no distress  EYES: Eyes grossly normal to inspection, PERRL and conjunctivae and sclerae normal  HENT: normal cephalic/atraumatic, ear canals and TM's normal, nasal mucosa edematous , rhinorrhea clear, oropharynx clear, oral mucous membranes moist and sinuses: not tender  NECK: no adenopathy, no asymmetry, masses, or scars and thyroid normal to palpation  RESP: Rhonchi diffusely cleared with coughing, faint crackles and wheezes right lower lung fields.  Normal excursion, no respiratory distress.   CV: regular rate and rhythm, normal S1 S2, no S3 or S4, no murmur, click or rub, no peripheral edema and peripheral pulses strong  MS: no gross musculoskeletal defects noted, no edema    Diagnostic Test Results:  Labs reviewed in Epic        Assessment & Plan       ICD-10-CM    1. Lower respiratory tract infection J22 azithromycin (ZITHROMAX) 250 MG tablet     albuterol (PROAIR RESPICLICK) 108 (90 Base) MCG/ACT inhaler       Symptoms likely developed due to acute viral illness, however given the persistence of symptoms, fevers recommended treatment with antibiotics for possible bacterial bronchitis, lower respiratory tract infection, atypical pneumonia.  He continues to use E-cigarettes and discouraged use and to discontinue as he still is injuring the lung tissues.  Recommended nasal steroids and nasal saline rinses to prevent sinusitis due to edema in " the nasal tissues.  Recommended mucinex with decongestant to help symptoms.  Recommended albuterol use and discussed how to properly use, he is thinking of picking it up, last time did not because of cost.      Return in about 5 days (around 9/23/2019) for If not improving, sooner if worse or new concerns.  Recommended Physical in the next 3 months .     Options for treatment and follow-up care were reviewed with the patient and/or guardian. Patient and/or guardian engaged in the decision making process and verbalized understanding of the options discussed and agreed with the final plan.    Kush Begum PA-C  [facility]

## 2019-09-18 ENCOUNTER — OFFICE VISIT (OUTPATIENT)
Dept: FAMILY MEDICINE | Facility: OTHER | Age: 34
End: 2019-09-18
Payer: COMMERCIAL

## 2019-09-18 VITALS
OXYGEN SATURATION: 97 % | DIASTOLIC BLOOD PRESSURE: 86 MMHG | RESPIRATION RATE: 16 BRPM | BODY MASS INDEX: 32.56 KG/M2 | SYSTOLIC BLOOD PRESSURE: 124 MMHG | WEIGHT: 232.6 LBS | HEIGHT: 71 IN | HEART RATE: 76 BPM | TEMPERATURE: 98.9 F

## 2019-09-18 DIAGNOSIS — J22 LOWER RESPIRATORY TRACT INFECTION: Primary | ICD-10-CM

## 2019-09-18 PROCEDURE — 99213 OFFICE O/P EST LOW 20 MIN: CPT | Performed by: PHYSICIAN ASSISTANT

## 2019-09-18 RX ORDER — AZITHROMYCIN 250 MG/1
TABLET, FILM COATED ORAL
Qty: 6 TABLET | Refills: 0 | Status: SHIPPED | OUTPATIENT
Start: 2019-09-18 | End: 2019-09-23

## 2019-09-18 ASSESSMENT — ANXIETY QUESTIONNAIRES
GAD7 TOTAL SCORE: 4
4. TROUBLE RELAXING: NOT AT ALL
GAD7 TOTAL SCORE: 4
5. BEING SO RESTLESS THAT IT IS HARD TO SIT STILL: NOT AT ALL
GAD7 TOTAL SCORE: 4
6. BECOMING EASILY ANNOYED OR IRRITABLE: SEVERAL DAYS
3. WORRYING TOO MUCH ABOUT DIFFERENT THINGS: SEVERAL DAYS
2. NOT BEING ABLE TO STOP OR CONTROL WORRYING: SEVERAL DAYS
7. FEELING AFRAID AS IF SOMETHING AWFUL MIGHT HAPPEN: NOT AT ALL
7. FEELING AFRAID AS IF SOMETHING AWFUL MIGHT HAPPEN: NOT AT ALL
1. FEELING NERVOUS, ANXIOUS, OR ON EDGE: SEVERAL DAYS

## 2019-09-18 ASSESSMENT — PATIENT HEALTH QUESTIONNAIRE - PHQ9
SUM OF ALL RESPONSES TO PHQ QUESTIONS 1-9: 0
SUM OF ALL RESPONSES TO PHQ QUESTIONS 1-9: 0
10. IF YOU CHECKED OFF ANY PROBLEMS, HOW DIFFICULT HAVE THESE PROBLEMS MADE IT FOR YOU TO DO YOUR WORK, TAKE CARE OF THINGS AT HOME, OR GET ALONG WITH OTHER PEOPLE: NOT DIFFICULT AT ALL

## 2019-09-18 ASSESSMENT — MIFFLIN-ST. JEOR: SCORE: 2025.05

## 2019-09-19 ASSESSMENT — PATIENT HEALTH QUESTIONNAIRE - PHQ9: SUM OF ALL RESPONSES TO PHQ QUESTIONS 1-9: 0

## 2019-09-19 ASSESSMENT — ANXIETY QUESTIONNAIRES: GAD7 TOTAL SCORE: 4

## 2019-09-23 ENCOUNTER — ANCILLARY PROCEDURE (OUTPATIENT)
Dept: GENERAL RADIOLOGY | Facility: OTHER | Age: 34
End: 2019-09-23
Attending: FAMILY MEDICINE
Payer: COMMERCIAL

## 2019-09-23 ENCOUNTER — OFFICE VISIT (OUTPATIENT)
Dept: FAMILY MEDICINE | Facility: OTHER | Age: 34
End: 2019-09-23
Payer: COMMERCIAL

## 2019-09-23 VITALS
HEART RATE: 68 BPM | HEIGHT: 72 IN | DIASTOLIC BLOOD PRESSURE: 72 MMHG | SYSTOLIC BLOOD PRESSURE: 110 MMHG | OXYGEN SATURATION: 98 % | RESPIRATION RATE: 18 BRPM | BODY MASS INDEX: 31.42 KG/M2 | WEIGHT: 232 LBS | TEMPERATURE: 99 F

## 2019-09-23 DIAGNOSIS — S27.309D ACUTE LUNG INJURY, SUBSEQUENT ENCOUNTER: ICD-10-CM

## 2019-09-23 DIAGNOSIS — Z72.0 TOBACCO ABUSE: Primary | ICD-10-CM

## 2019-09-23 DIAGNOSIS — J22 LOWER RESPIRATORY TRACT INFECTION: ICD-10-CM

## 2019-09-23 PROCEDURE — 71046 X-RAY EXAM CHEST 2 VIEWS: CPT

## 2019-09-23 PROCEDURE — 99213 OFFICE O/P EST LOW 20 MIN: CPT | Performed by: FAMILY MEDICINE

## 2019-09-23 RX ORDER — PREDNISONE 20 MG/1
20 TABLET ORAL DAILY
Qty: 5 TABLET | Refills: 0 | Status: SHIPPED | OUTPATIENT
Start: 2019-09-23 | End: 2019-09-28

## 2019-09-23 ASSESSMENT — MIFFLIN-ST. JEOR: SCORE: 2027.41

## 2019-09-23 NOTE — PROGRESS NOTES
Tino Bowers is a 33 year old male who presents to clinic today for the following health issues:    HPI   Acute Illness   Acute illness concerns: cough  Onset: 2 weeks    Fever: no    Chills/Sweats: YES    Headache (location?): YES    Sinus Pressure:YES    Conjunctivitis:  no    Ear Pain: YES: right    Rhinorrhea: YES    Congestion: YES    Sore Throat: YES- from cough     Cough: YES-can feel the mucus in his chest but is unable to get it out    Wheeze: YES    Decreased Appetite: YES    Nausea: no    Vomiting: no    Diarrhea:  no    Dysuria/Freq.: no    Fatigue/Achiness: YES    Sick/Strep Exposure: YES- patient is a  so is around hundreds of people weekly      Therapies Tried and outcome: Dayquil, Nyquil, Azithromycin, Albuterol - no change     -------------------------------------    Patient Active Problem List   Diagnosis     Inguinal hernia     Pigmented skin lesions     Obesity     Impaired fasting glucose     Edema     Chronic pain syndrome     Chronic low back pain, unspecified back pain laterality, with sciatica presence unspecified     Tobacco abuse     Past Surgical History:   Procedure Laterality Date     BACK SURGERY       C BONE CUTTING SEGMENTED      Foot  spur     C EXPLORATION OF EPIDIDYMIS      cyst     HC KNEE SCOPE, DIAGNOSTIC  /    Arthroscopy, Knee     HC LAP,INGUINAL HERNIA REPR,INITIAL  2005    Bilateral indirect inguinal hernias.     LAPAROSCOPIC APPENDECTOMY  2011    Procedure:LAPAROSCOPIC APPENDECTOMY; Surgeon:AHMET MATTHEWS; Location: OR       Social History     Tobacco Use     Smoking status: Former Smoker     Packs/day: 0.50     Years: 8.00     Pack years: 4.00     Types: e-Cigarettes or another electronic nicotine delivery system     Last attempt to quit: 2013     Years since quittin.2     Smokeless tobacco: Former User     Tobacco comment: E-cig user has not used since been sick    Substance Use Topics     Alcohol use:  Not Currently     Alcohol/week: 0.0 standard drinks     Comment: none      Family History   Problem Relation Age of Onset     Diabetes Father      Hypertension Father      Cerebrovascular Disease Father      Alcohol/Drug Father      Arthritis Father      Depression Father      Heart Disease Father      Lipids Father      Neurologic Disorder Father      Obesity Father      Cancer Father         testicular     Parkinsonism Father      Seizure Disorder Father      Breast Cancer Maternal Grandmother      Arthritis Maternal Grandmother      Depression Maternal Grandmother      Lipids Maternal Grandmother      Obesity Maternal Grandmother      Respiratory Maternal Grandmother      Parkinsonism Maternal Grandmother      Alcohol/Drug Mother      Depression Mother      Breast Cancer Mother      Arthritis Maternal Grandfather      Parkinsonism Maternal Grandfather      Alzheimer Disease Maternal Grandfather      Arthritis Paternal Grandmother      Parkinsonism Paternal Grandmother      Arthritis Paternal Grandfather      Parkinsonism Paternal Grandfather      Family History Negative Other      Diabetes Maternal Aunt      Diabetes Maternal Uncle      Cancer Maternal Aunt         female     Cancer Maternal Uncle         skin0.     Ovarian Cancer No family hx of      Prostate Cancer No family hx of      Mental Illness No family hx of      Asthma No family hx of      Known Genetic Syndrome No family hx of      Thyroid Disease No family hx of            Reviewed and updated as needed this visit by Provider  Tobacco  Allergies  Meds  Problems  Med Hx  Surg Hx  Fam Hx         Review of Systems   Constitutional, HEENT, cardiovascular, pulmonary, GI, , musculoskeletal, neuro, skin, endocrine and psych systems are negative, except as in HPI or otherwise noted         Objective    /72 (BP Location: Left arm, Patient Position: Chair, Cuff Size: Adult Large)   Pulse 68   Temp 99  F (37.2  C) (Temporal)   Resp 18   Ht  "1.816 m (5' 11.5\")   Wt 105.2 kg (232 lb)   SpO2 98%   BMI 31.91 kg/m    Body mass index is 31.91 kg/m .  Physical Exam   GENERAL: healthy, alert and no distress  EYES: Eyes grossly normal to inspection, PERRL and conjunctivae and sclerae normal  HENT: ear canals and TM's normal, and mouth without ulcers or lesions. Mild nasal congestion noted  NECK: no adenopathy, no asymmetry, masses, or scars and thyroid normal to palpation  RESP: harsh lung sounds upon entering the room. Is audibly coarse. No obvious area of worsening, but gets out of breath easily with walking to the CXR as well.  CV: regular rate and rhythm, normal S1 S2, no S3 or S4, no murmur, click or rub, no peripheral edema and peripheral pulses strong  ABDOMEN: soft, nontender, no hepatosplenomegaly, no masses and bowel sounds normal  MS: no gross musculoskeletal defects noted, no edema  SKIN: no suspicious lesions or rashes  NEURO: Normal strength and tone, mentation intact and speech normal  PSYCH: mentation appears normal, affect normal/bright    CXR: diffuse inflammation per my read. Awaiting official report        Assessment & Plan       ICD-10-CM    1. Tobacco abuse Z72.0    2. Lower respiratory tract infection J22 XR Chest 2 Views   3. Acute lung injury, subsequent encounter S27.309D predniSONE (DELTASONE) 20 MG tablet     Patient had started antibiotics and inhaler last week following URI illness. Has h/o smoking and concern for bronchitis. Though he more recently has been vaping to try to help him quit smoking and this illness is much worse than any he had in the past and had no response to the antibiotics and inhalers and has quit vaping since last week.     BMI:   Estimated body mass index is 31.91 kg/m  as calculated from the following:    Height as of this encounter: 1.816 m (5' 11.5\").    Weight as of this encounter: 105.2 kg (232 lb).   Weight management plan: Discussed healthy diet and exercise guidelines      Return in about 1 week " (around 9/30/2019) for if not improved.    Yaritza Castorena MD, MD  [facility]

## 2019-09-23 NOTE — PROGRESS NOTES
MDBEN: Vaping form completed per providers request.  Faxed to [phone]    Curtis Sterling RN, BSN

## 2019-09-27 ENCOUNTER — HEALTH MAINTENANCE LETTER (OUTPATIENT)
Age: 34
End: 2019-09-27

## 2020-07-27 ENCOUNTER — VIRTUAL VISIT (OUTPATIENT)
Dept: FAMILY MEDICINE | Facility: OTHER | Age: 35
End: 2020-07-27

## 2020-07-27 NOTE — PROGRESS NOTES
"Date: 2020 13:33:19  Clinician: Meme Wu  Clinician NPI: [id number]  Patient: Moris Bowers  Patient : 1985  Patient Address: [address] [place] [id number]  Patient Phone: [phone]  Visit Protocol: URI  Patient Summary:  Moris is a 34 year old ( : 1985 ) male who initiated a Visit for COVID-19 (Coronavirus) evaluation and screening. When asked the question \"Please sign me up to receive news, health information and promotions. \", Moris responded \"No\".    Moris states his symptoms started 1-2 days ago.   His symptoms consist of a sore throat, anosmia, facial pain or pressure, a cough, nasal congestion, and rhinitis. He is experiencing difficulty breathing due to nasal congestion but he is not short of breath.   Symptom details     Nasal secretions: The color of his mucus is clear, green, and yellow.    Cough: Moris coughs every 5-10 minutes and his cough is more bothersome at night. Phlegm comes into his throat when he coughs. He believes his cough is caused by post-nasal drip. The color of the phlegm is white, green, and yellow.     Sore throat: Moris reports having mild throat pain (1-3 on a 10 point pain scale), does not have exudate on his tonsils, and can swallow liquids. The lymph nodes in his neck are not enlarged. A rash has not appeared on the skin since the sore throat started.     Facial pain or pressure: The facial pain or pressure feels worse when bending over or leaning forward.      Moris denies having wheezing, nausea, teeth pain, ageusia, diarrhea, myalgias, fever, vomiting, malaise, ear pain, headache, chills, and enlarged lymph nodes. He also denies having recent facial or sinus surgery in the past 60 days, taking antibiotic medication in the past month, and having a sinus infection within the past year.   Precipitating events  Within the past week, Moris has not been exposed to someone with strep throat. " He has not recently been exposed to someone with influenza. Moris has been in close contact with the following high risk individuals: people with asthma, heart disease or diabetes.   Pertinent COVID-19 (Coronavirus) information  In the past 14 days, Moris has not worked in a congregate living setting.   He does not work or volunteer as healthcare worker or a  and does not work or volunteer in a healthcare facility.   Moris also has not lived in a congregate living setting in the past 14 days. He does not live with a healthcare worker.   Moris has had a close contact with a laboratory-confirmed COVID-19 patient within 14 days of symptom onset. 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 Went to doctor with a positive covid test. That was on 7/8/20   Pertinent medical history  Moris needs a return to work/school note.   Weight: 240 lbs   Moris does not smoke or use smokeless tobacco.   Weight: 240 lbs    MEDICATIONS: ibuprofen-phenylephrine oral, Tylenol Extra Strength oral, Excedrin Migraine oral, ALLERGIES: NKDA  Clinician Response:  Dear Moris,   Your symptoms show that you may have coronavirus (COVID-19). This illness can cause fever, cough and trouble breathing. Many people get a mild case and get better on their own. Some people can get very sick.  What should I do?  We would like to test you for this virus.   1. Please call [phone] to schedule your visit. Explain that [facility]. Be ready to share your [facility] visit ID number.  The following will serve as your written order for this COVID Test, ordered by me, for the indication of suspected COVID [Z20.828]: The test will be ordered in itBit, our electronic health record, after you are scheduled. It will show as  "ordered and authorized by Ad Orr MD.  Order: COVID-19 (Coronavirus) PCR for SYMPTOMATIC testing from [facility].      2. When it's time for your COVID test:  Stay at least 6 feet away from others. (If someone will drive you to your test, stay in the backseat, as far away from the  as you can.)   Cover your mouth and nose with a mask, tissue or washcloth.  Go straight to the testing site. Don't make any stops on the way there or back.      3.Starting now: Stay home and away from others (self-isolate) until:   You've had no fever---and no medicine that reduces fever---for 3 full days (72 hours). And...   Your other symptoms have gotten better. For example, your cough or breathing has improved. And...   At least 10 days have passed since your symptoms started.       During this time, don't leave the house except for testing or medical care.   Stay in your own room, even for meals. Use your own bathroom if you can.   Stay away from others in your home. No hugging, kissing or shaking hands. No visitors.  Don't go to work, school or anywhere else.    Clean \"high touch\" surfaces often (doorknobs, counters, handles, etc.). Use a household cleaning spray or wipes. You'll find a full list of  on the EPA website: www.epa.gov/pesticide-registration/list-n-disinfectants-use-against-sars-cov-2.   Cover your mouth and nose with a mask, tissue or washcloth to avoid spreading germs.  Wash your hands and face often. Use soap and water.  Caregivers in these groups are at risk for severe illness due to COVID-19:  o People 65 years and older  o People who live in a nursing home or long-term care facility  o People with chronic disease (lung, heart, cancer, diabetes, kidney, liver, immunologic)  o People who have a weakened immune system, including those who:   Are in cancer treatment  Take medicine that weakens the immune system, such as corticosteroids  Had a bone marrow or organ transplant  Have an immune deficiency  " Have poorly controlled HIV or AIDS  Are obese (body mass index of 40 or higher)  Smoke regularly   o Caregivers should wear gloves while washing dishes, handling laundry and cleaning bedrooms and bathrooms.  o Use caution when washing and drying laundry: Don't shake dirty laundry, and use the warmest water setting that you can.  o For more tips, go to www.cdc.gov/coronavirus/2019-ncov/downloads/10Things.pdf.    4.Sign up for [employer]. We know it's scary to hear that you might have COVID-19. We want to track your symptoms to make sure you're okay over the next 2 weeks. Please look for an email from [employer]---this is a free, online program that we'll use to keep in touch. To sign up, follow the link in the email. Learn more at [email or web address]  How can I take care of myself?   Get lots of rest. Drink extra fluids (unless a doctor has told you not to).   Take Tylenol (acetaminophen) for fever or pain. If you have liver or kidney problems, ask your family doctor if it's okay to take Tylenol.   Adults can take either:    650 mg (two 325 mg pills) every 4 to 6 hours, or...   1,000 mg (two 500 mg pills) every 8 hours as needed.    Note: Don't take more than 3,000 mg in one day. Acetaminophen is found in many medicines (both prescribed and over-the-counter medicines). Read all labels to be sure you don't take too much.   For children, check the Tylenol bottle for the right dose. The dose is based on the child's age or weight.    If you have other health problems (like cancer, heart failure, an organ transplant or severe kidney disease): Call your specialty clinic if you don't feel better in the next 2 days.       Know when to call 911. Emergency warning signs include:    Trouble breathing or shortness of breath Pain or pressure in the chest that doesn't go away Feeling confused like you haven't felt before, or not being able to wake up Bluish-colored lips or face.  Where can I get more information?    [facility] -- About COVID-19: [email or web address]   CDC -- What to Do If You're Sick: www.cdc.gov/coronavirus/2019-ncov/about/steps-when-sick.html   CDC -- Ending Home Isolation: www.cdc.gov/coronavirus/2019-ncov/hcp/disposition-in-home-patients.html   [facility] -- Caring for Someone: www.cdc.gov/coronavirus/2019-ncov/if-you-are-sick/care-for-someone.html   [facility] -- Interim Guidance for Hospital Discharge to Home: www.[facility].Critical access hospital.mn./diseases/coronavirus/hcp/hospdischarge.pdf   [facility] clinical trials (COVID-19 research studies): clinicalaffairs.[facility]-clinical-trials    Below are the COVID-19 hotlines at the [place] Department of Health ([facility]). Interpreters are available.    For health questions: Call 649-790-0640 or 1-370.288.3446 (7 a.m. to 7 p.m.) For questions about schools and childcare: Call 432-862-8128 or 1-494.274.4748 (7 a.m. to 7 p.m.)    Diagnosis: Cough  Diagnosis ICD: R05

## 2020-07-28 DIAGNOSIS — Z20.822 SUSPECTED COVID-19 VIRUS INFECTION: Primary | ICD-10-CM

## 2020-07-28 PROCEDURE — U0003 INFECTIOUS AGENT DETECTION BY NUCLEIC ACID (DNA OR RNA); SEVERE ACUTE RESPIRATORY SYNDROME CORONAVIRUS 2 (SARS-COV-2) (CORONAVIRUS DISEASE [COVID-19]), AMPLIFIED PROBE TECHNIQUE, MAKING USE OF HIGH THROUGHPUT TECHNOLOGIES AS DESCRIBED BY CMS-2020-01-R: HCPCS | Performed by: FAMILY MEDICINE

## 2020-07-28 NOTE — PROGRESS NOTES
COVID-19 PCR test completed. Patient handout For Patients Who Have Been Tested for Covid-19 (Coronavirus) was given to the patient, which includes test result notification process.

## 2020-07-29 LAB
SARS-COV-2 RNA SPEC QL NAA+PROBE: NOT DETECTED
SPECIMEN SOURCE: NORMAL

## 2020-11-10 ENCOUNTER — VIRTUAL VISIT (OUTPATIENT)
Dept: FAMILY MEDICINE | Facility: OTHER | Age: 35
End: 2020-11-10

## 2020-11-10 DIAGNOSIS — Z20.822 SUSPECTED COVID-19 VIRUS INFECTION: Primary | ICD-10-CM

## 2020-11-10 PROCEDURE — U0003 INFECTIOUS AGENT DETECTION BY NUCLEIC ACID (DNA OR RNA); SEVERE ACUTE RESPIRATORY SYNDROME CORONAVIRUS 2 (SARS-COV-2) (CORONAVIRUS DISEASE [COVID-19]), AMPLIFIED PROBE TECHNIQUE, MAKING USE OF HIGH THROUGHPUT TECHNOLOGIES AS DESCRIBED BY CMS-2020-01-R: HCPCS | Performed by: FAMILY MEDICINE

## 2020-11-10 NOTE — PROGRESS NOTES
"Date: 11/10/2020 06:15:12  Clinician: Tessa Stewart  Clinician NPI: [id number]  Patient: Moris Bowers  Patient : 1985  Patient Address: [address] [place] [id number]  Patient Phone: [phone]  Visit Protocol: URI  Patient Summary:  Moris is a 35 year old ( : 1985 ) male who initiated a [facility] Visit for COVID-19 (Coronavirus) evaluation and screening. When asked the question \"Please sign me up to receive news, health information and promotions. \", Moris responded \"No\".    Moris states his symptoms started today.   His symptoms consist of myalgia, malaise, a sore throat, diarrhea, a headache, and a cough. Moris also feels feverish.   Symptom details     Cough: Moris coughs every 5-10 minutes and his cough is more bothersome at night. Phlegm comes into his throat when he coughs. He does not believe his cough is caused by post-nasal drip. The color of the phlegm is green and yellow.     Sore throat: Moris reports having mild throat pain (1-3 on a 10 point pain scale), does not have exudate on his tonsils, and can swallow liquids. He is not sure if the lymph nodes in his neck are enlarged. A rash has not appeared on the skin since the sore throat started.     Temperature: His current temperature is 98.2 degrees Fahrenheit.     Headache: He states the headache is mild (1-3 on a 10 point pain scale).      Moris denies having vomiting, rhinitis, facial pain or pressure, chills, teeth pain, ageusia, ear pain, wheezing, nasal congestion, nausea, and anosmia. He also denies taking antibiotic medication in the past month, having recent facial or sinus surgery in the past 60 days, and having a sinus infection within the past year. He is not experiencing dyspnea.   Precipitating events  Within the past week, Moris has not been exposed to someone with strep throat. He has not recently been exposed to someone with influenza. Moris has not " been in close contact with any high risk individuals.   Pertinent COVID-19 (Coronavirus) information  Moris does not work or volunteer as healthcare worker or a . In the past 14 days, Moris has not worked or volunteered at a healthcare facility or group living setting.   In the past 14 days, he also has not lived in a congregate living setting.   Moris has not had a close contact with a laboratory-confirmed COVID-19 patient within 14 days of symptom onset.    Since December 2019, Moris has been tested for COVID-19 and has not had upper respiratory infection or influenza-like illness.      Result of COVID-19 test: Negative     Date of his COVID-19 test: 07/26/2020      Pertinent medical history  Moris needs a return to work/school note.   Weight: 235 lbs   Moris smokes or uses smokeless tobacco.   Weight: 235 lbs    MEDICATIONS: ibuprofen-phenylephrine oral, Tylenol Extra Strength oral, Excedrin Migraine oral, ALLERGIES: NKDA  Clinician Response:  Dear Moris,   Your symptoms show that you may have coronavirus (COVID-19). This illness can cause fever, cough and trouble breathing. Many people get a mild case and get better on their own. Some people can get very sick.  What should I do?  We would like to test you for this virus.   1. Please call [phone] to schedule your visit. Explain that [facility]. Be ready to share your [facility] visit ID number.  * If you need to schedule in [facility] please call [phone] or for [employer] employees please call [phone].  * If you need to schedule in the [place] area please call [phone]. [employer] employees call [phone].  The following will serve as your written order for this COVID Test, ordered by me, for the indication of suspected COVID [Z20.828]: The test will be ordered in [employer], our electronic health record, after you are scheduled. It will show as ordered and  "authorized by Ad Orr MD.  Order: COVID-19 (Coronavirus) PCR for SYMPTOMATIC testing from [facility].   2. When it's time for your COVID test:  Stay at least 6 feet away from others. (If someone will drive you to your test, stay in the backseat, as far away from the  as you can.)   Cover your mouth and nose with a mask, tissue or washcloth.  Go straight to the testing site. Don't make any stops on the way there or back.      3.Starting now: Stay home and away from others (self-isolate) until:   You've had no fever---and no medicine that reduces fever---for one full day (24 hours). And...   Your other symptoms have gotten better. For example, your cough or breathing has improved. And...   At least 10 days have passed since your symptoms started.       During this time, don't leave the house except for testing or medical care.   Stay in your own room, even for meals. Use your own bathroom if you can.   Stay away from others in your home. No hugging, kissing or shaking hands. No visitors.  Don't go to work, school or anywhere else.    Clean \"high touch\" surfaces often (doorknobs, counters, handles, etc.). Use a household cleaning spray or wipes. You'll find a full list of  on the EPA website: www.epa.gov/pesticide-registration/list-n-disinfectants-use-against-sars-cov-2.   Cover your mouth and nose with a mask, tissue or washcloth to avoid spreading germs.  Wash your hands and face often. Use soap and water.  Caregivers in these groups are at risk for severe illness due to COVID-19:  o People 65 years and older  o People who live in a nursing home or long-term care facility  o People with chronic disease (lung, heart, cancer, diabetes, kidney, liver, immunologic)  o People who have a weakened immune system, including those who:   Are in cancer treatment  Take medicine that weakens the immune system, such as corticosteroids  Had a bone marrow or organ transplant  Have an immune deficiency  Have poorly " controlled HIV or AIDS  Are obese (body mass index of 40 or higher)  Smoke regularly   o Caregivers should wear gloves while washing dishes, handling laundry and cleaning bedrooms and bathrooms.  o Use caution when washing and drying laundry: Don't shake dirty laundry, and use the warmest water setting that you can.  o For more tips, go to www.cdc.gov/coronavirus/2019-ncov/downloads/10Things.pdf.    4.Sign up for [employer]. We know it's scary to hear that you might have COVID-19. We want to track your symptoms to make sure you're okay over the next 2 weeks. Please look for an email from [employer]---this is a free, online program that we'll use to keep in touch. To sign up, follow the link in the email. Learn more at [email or web address]  How can I take care of myself?   Get lots of rest. Drink extra fluids (unless a doctor has told you not to).   Take Tylenol (acetaminophen) for fever or pain. If you have liver or kidney problems, ask your family doctor if it's okay to take Tylenol.   Adults can take either:    650 mg (two 325 mg pills) every 4 to 6 hours, or...   1,000 mg (two 500 mg pills) every 8 hours as needed.    Note: Don't take more than 3,000 mg in one day. Acetaminophen is found in many medicines (both prescribed and over-the-counter medicines). Read all labels to be sure you don't take too much.   For children, check the Tylenol bottle for the right dose. The dose is based on the child's age or weight.    If you have other health problems (like cancer, heart failure, an organ transplant or severe kidney disease): Call your specialty clinic if you don't feel better in the next 2 days.       Know when to call 911. Emergency warning signs include:    Trouble breathing or shortness of breath Pain or pressure in the chest that doesn't go away Feeling confused like you haven't felt before, or not being able to wake up Bluish-colored lips or face.  Where can I get more information?   [facility]  [facility] -- About COVID-19: [email or web address]   CDC -- What to Do If You're Sick: www.cdc.gov/coronavirus/2019-ncov/about/steps-when-sick.html   CDC -- Ending Home Isolation: www.cdc.gov/coronavirus/2019-ncov/hcp/disposition-in-home-patients.html   [facility] -- Caring for Someone: www.cdc.gov/coronavirus/2019-ncov/if-you-are-sick/care-for-someone.html   [facility] -- Interim Guidance for Hospital Discharge to Home: www.[facility].mn./diseases/coronavirus/hcp/hospdischarge.pdf   [facility] clinical trials (COVID-19 research studies): clinicalaffairs.[facility]-clinical-trials    Below are the COVID-19 hotlines at the [place] Department of Health ([facility]). Interpreters are available.    For health questions: Call 072-502-2842 or 1-987.400.1751 (7 a.m. to 7 p.m.) For questions about schools and childcare: Call 903-002-1458 or 1-626.112.7411 (7 a.m. to 7 p.m.)    Diagnosis: Other malaise  Diagnosis ICD: R53.81

## 2020-11-11 LAB
SARS-COV-2 RNA SPEC QL NAA+PROBE: NOT DETECTED
SPECIMEN SOURCE: NORMAL

## 2021-01-09 ENCOUNTER — HEALTH MAINTENANCE LETTER (OUTPATIENT)
Age: 36
End: 2021-01-09

## 2021-01-25 NOTE — PROGRESS NOTES
"Assessment & Plan       ICD-10-CM    1. Chronic midline low back pain without sciatica  M54.5 PAIN MANAGEMENT REFERRAL    G89.29 cyclobenzaprine (FLEXERIL) 10 MG tablet     oxyCODONE-acetaminophen (PERCOCET) 5-325 MG tablet      Pain is muscular with much tension at this time, though he admits to having been the same as the last time he had pain and had RFA. He wants to return to Dr. Kahn or another provider at the pain clinic and called there first, but they will not let him return without a referral. He is interested in returning for RFA or whatever is advised and would like to get back ASAP.  Referral placed and will plan flexeril and/or oxycodone in the meantime. Already had weight loss by improved activity and quit smoking, so eliminated most of his preceding risk from prior, so was hopeful the pain would stay away longer than it did.    Review of prior external note(s) from - pain clinic and imaging      20 minutes spent on the date of the encounter doing chart review, history and exam, documentation and further activities as noted above         BMI:   Estimated body mass index is 31.63 kg/m  as calculated from the following:    Height as of this encounter: 1.816 m (5' 11.5\").    Weight as of this encounter: 104.3 kg (230 lb).   Weight management plan: Discussed healthy diet and exercise guidelines          No follow-ups on file.    Yaritza Castorena MD, MD  [facility] is a 35 year old male who presents to clinic today for the following health issues  HPI       Back Pain  Onset/Duration: ongoing from 2017- but returning full force within last month   Description:   Location of pain: low back both  Character of pain: sharp  Pain radiation: none  New numbness or weakness in legs, not attributed to pain: no   Intensity: Currently 9/10  Progression of Symptoms: constant- worsening   History:   Specific cause: none  Pain interferes with job: YES  History " "of back problems: recurrent self limited episodes of low back pain in the past  Any previous MRI or X-rays: Yes- at [place].   Sees a specialist for back pain: Not currently   Alleviating factors:   Improved by: ablation helped    Precipitating factors:  Worsened by: Lifting, Bending, Standing and Sitting  Therapies tried and outcome: otc meds      Accompanying Signs & Symptoms:  Risk of Fracture: None  Risk of Cauda Equina: None  Risk of Infection: None  Risk of Cancer: None  Risk of Ankylosing Spondylitis: Onset at age <35, male, AND morning back stiffness no          Review of Systems   Constitutional, HEENT, cardiovascular, pulmonary, GI, , musculoskeletal, neuro, skin, endocrine and psych systems are negative, except as otherwise noted.      Objective    /84   Pulse 78   Temp 97.1  F (36.2  C) (Temporal)   Resp 18   Ht 1.816 m (5' 11.5\")   Wt 104.3 kg (230 lb)   SpO2 99%   BMI 31.63 kg/m    Body mass index is 31.63 kg/m .  Physical Exam   GENERAL: healthy, alert and no distress  RESP: lungs clear to auscultation - no rales, rhonchi or wheezes  CV: regular rate and rhythm, normal S1 S2, no S3 or S4, no murmur, click or rub, no peripheral edema and peripheral pulses strong  MS: Back Exam     Tenderness   The patient is experiencing tenderness in the lumbar.    Range of Motion   Extension: abnormal Back extension: some discomfort noted.   Flexion: abnormal Back flexion: declines, states terrible pain with this.   Lateral bend right: normal   Lateral bend left: normal   Rotation right: normal   Rotation left: normal     Muscle Strength   The patient has normal back strength.    Tests   Straight leg raise right: negative  Straight leg raise left: negative    Reflexes   Patellar: Hyperreflexic  Achilles: normal    Other   Sensation: normal  Gait: antalgic   Erythema: no back redness            SKIN: no suspicious lesions or rashes  NEURO: Normal strength and tone, mentation intact and speech " normal  PSYCH: mentation appears normal, affect normal/bright

## 2021-01-27 ENCOUNTER — OFFICE VISIT (OUTPATIENT)
Dept: FAMILY MEDICINE | Facility: OTHER | Age: 36
End: 2021-01-27
Payer: COMMERCIAL

## 2021-01-27 VITALS
SYSTOLIC BLOOD PRESSURE: 122 MMHG | TEMPERATURE: 97.1 F | WEIGHT: 230 LBS | HEART RATE: 78 BPM | RESPIRATION RATE: 18 BRPM | DIASTOLIC BLOOD PRESSURE: 84 MMHG | OXYGEN SATURATION: 99 % | HEIGHT: 72 IN | BODY MASS INDEX: 31.15 KG/M2

## 2021-01-27 DIAGNOSIS — M54.50 CHRONIC MIDLINE LOW BACK PAIN WITHOUT SCIATICA: Primary | ICD-10-CM

## 2021-01-27 DIAGNOSIS — G89.29 CHRONIC MIDLINE LOW BACK PAIN WITHOUT SCIATICA: Primary | ICD-10-CM

## 2021-01-27 PROCEDURE — 99213 OFFICE O/P EST LOW 20 MIN: CPT | Performed by: FAMILY MEDICINE

## 2021-01-27 RX ORDER — OXYCODONE AND ACETAMINOPHEN 5; 325 MG/1; MG/1
1 TABLET ORAL EVERY 6 HOURS PRN
Qty: 12 TABLET | Refills: 0 | Status: SHIPPED | OUTPATIENT
Start: 2021-01-27 | End: 2021-02-01

## 2021-01-27 RX ORDER — CYCLOBENZAPRINE HCL 10 MG
10 TABLET ORAL 3 TIMES DAILY PRN
Qty: 60 TABLET | Refills: 1 | Status: SHIPPED | OUTPATIENT
Start: 2021-01-27 | End: 2021-07-06

## 2021-01-27 ASSESSMENT — PAIN SCALES - GENERAL: PAINLEVEL: EXTREME PAIN (9)

## 2021-01-27 ASSESSMENT — MIFFLIN-ST. JEOR: SCORE: 2008.33

## 2021-01-29 ENCOUNTER — TELEPHONE (OUTPATIENT)
Dept: PALLIATIVE MEDICINE | Facility: CLINIC | Age: 36
End: 2021-01-29

## 2021-01-29 NOTE — TELEPHONE ENCOUNTER
----- Message from Yaritza Castorena MD sent at 1/27/2021  4:08 PM CST -----  DEEPTII - this is a previous patient who saw you 3 years ago and would like to be seen ASAP for repeat treatment and evaluation. I put in evaluation rather than just a procedure visit as he is quite tense today. But since he was known to the clinic and recurrent previous problem, he was hoping for not too long of a delay, so I told him I would reach out in case that helped get him in sooner  Yaritza Castorena MD, MD on 1/27/2021 at 4:10 PM

## 2021-01-30 NOTE — TELEPHONE ENCOUNTER

## 2021-02-01 ENCOUNTER — MYC REFILL (OUTPATIENT)
Dept: FAMILY MEDICINE | Facility: OTHER | Age: 36
End: 2021-02-01

## 2021-02-01 DIAGNOSIS — G89.29 CHRONIC MIDLINE LOW BACK PAIN WITHOUT SCIATICA: ICD-10-CM

## 2021-02-01 DIAGNOSIS — M54.50 CHRONIC MIDLINE LOW BACK PAIN WITHOUT SCIATICA: ICD-10-CM

## 2021-02-02 RX ORDER — OXYCODONE AND ACETAMINOPHEN 5; 325 MG/1; MG/1
1 TABLET ORAL EVERY 6 HOURS PRN
Qty: 12 TABLET | Refills: 0 | Status: SHIPPED | OUTPATIENT
Start: 2021-02-02 | End: 2021-02-03

## 2021-02-02 NOTE — TELEPHONE ENCOUNTER
Pending Prescriptions:                       Disp   Refills    oxyCODONE-acetaminophen (PERCOCET) 5-325 M*12 tab*0        Sig: Take 1 tablet by mouth every 6 hours as needed for           pain        Routing refill request to provider for review/approval because:  Drug not on the G refill protocol   Drug not active on patient's medication list  Last Seen: 1/27/21  Last Refilled: NA   Next Visit: 2/3/21  Annita Sequeira RN, BSN  [place]  February 2, 2021

## 2021-02-03 ENCOUNTER — OFFICE VISIT (OUTPATIENT)
Dept: FAMILY MEDICINE | Facility: OTHER | Age: 36
End: 2021-02-03
Payer: COMMERCIAL

## 2021-02-03 VITALS
WEIGHT: 229 LBS | TEMPERATURE: 98.2 F | BODY MASS INDEX: 31.02 KG/M2 | SYSTOLIC BLOOD PRESSURE: 122 MMHG | DIASTOLIC BLOOD PRESSURE: 80 MMHG | HEIGHT: 72 IN | HEART RATE: 72 BPM | OXYGEN SATURATION: 97 %

## 2021-02-03 DIAGNOSIS — G89.29 CHRONIC MIDLINE LOW BACK PAIN WITHOUT SCIATICA: ICD-10-CM

## 2021-02-03 DIAGNOSIS — M54.50 CHRONIC MIDLINE LOW BACK PAIN WITHOUT SCIATICA: ICD-10-CM

## 2021-02-03 PROCEDURE — 99213 OFFICE O/P EST LOW 20 MIN: CPT | Performed by: FAMILY MEDICINE

## 2021-02-03 RX ORDER — OXYCODONE AND ACETAMINOPHEN 5; 325 MG/1; MG/1
1 TABLET ORAL EVERY 6 HOURS PRN
Qty: 24 TABLET | Refills: 0 | Status: SHIPPED | OUTPATIENT
Start: 2021-02-03 | End: 2021-02-12

## 2021-02-03 ASSESSMENT — MIFFLIN-ST. JEOR: SCORE: 2003.8

## 2021-02-03 ASSESSMENT — PAIN SCALES - GENERAL: PAINLEVEL: EXTREME PAIN (8)

## 2021-02-03 NOTE — PROGRESS NOTES
"Assessment & Plan       ICD-10-CM    1. Chronic midline low back pain without sciatica  M54.5 oxyCODONE-acetaminophen (PERCOCET) 5-325 MG tablet    G89.29           Patient has been more mobile with the flexeril to relax his back, though still cannot bend forward without sharp pains down both legs and breaks out in a sweat.   Has appt with pain clinic next week to discuss plan.  Given antibiotics to last until then and at his current rate - 10 days total. Will see if this is part of the long term plan after his visit there. Will not need to see us for refill if plan established at that visit that is to be done within the next 1-2 weeks. Otherwise, may need to see him back if still needing prescription.     10 minutes spent on the date of the encounter doing chart review, history and exam, documentation and further activities as noted above               No follow-ups on file.    Yaritza Castorena MD, MD  [facility] is a 35 year old male who presents to clinic today for the following health issues  HPI     Chronic/Recurring Back Pain Follow Up      Where is your back pain located? (Select all that apply) low back both    How would you describe your back pain?  sharp    Where does your back pain spread? nowhere    Since your last clinic visit for back pain, how has your pain changed? unchanged    Does your back pain interfere with your job? No    Since your last visit, have you tried any new treatment? No    Flexeril is loosening the back, sleeps better with the pain meds.     Review of Systems   Constitutional, HEENT, cardiovascular, pulmonary, GI, , musculoskeletal, neuro, skin, endocrine and psych systems are negative, except as otherwise noted.      Objective    /80   Pulse 72   Temp 98.2  F (36.8  C) (Temporal)   Ht 1.816 m (5' 11.5\")   Wt 103.9 kg (229 lb)   SpO2 97%   BMI 31.49 kg/m    Body mass index is 31.49 kg/m .  Physical Exam "   GENERAL: healthy, alert and no distress  RESP: lungs clear to auscultation - no rales, rhonchi or wheezes  CV: regular rate and rhythm, normal S1 S2, no S3 or S4, no murmur, click or rub, no peripheral edema and peripheral pulses strong  MS: forward motion gives him sharp pains and he breaks out in a sweat. Better from seated position. He twists and moves more limberly today.  NEURO: Normal strength and tone, mentation intact and speech normal  PSYCH: mentation appears normal, affect normal/bright

## 2021-02-10 ENCOUNTER — VIRTUAL VISIT (OUTPATIENT)
Dept: PALLIATIVE MEDICINE | Facility: CLINIC | Age: 36
End: 2021-02-10
Payer: COMMERCIAL

## 2021-02-10 ENCOUNTER — TELEPHONE (OUTPATIENT)
Dept: PALLIATIVE MEDICINE | Facility: CLINIC | Age: 36
End: 2021-02-10

## 2021-02-10 DIAGNOSIS — M47.816 SPONDYLOSIS OF LUMBAR REGION WITHOUT MYELOPATHY OR RADICULOPATHY: Primary | ICD-10-CM

## 2021-02-10 PROCEDURE — 99204 OFFICE O/P NEW MOD 45 MIN: CPT | Mod: 95 | Performed by: PAIN MEDICINE

## 2021-02-10 ASSESSMENT — PAIN SCALES - GENERAL: PAINLEVEL: SEVERE PAIN (7)
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
## 2021-02-10 NOTE — TELEPHONE ENCOUNTER
Pt had a virtual visit with Dr. Kahn today and put in an order for a repeat bilateral Lumbar L4 - S1 RFA.     Per provider he had 2yrs with >80% relief     Will route to KATHRYN Fontaine for insurance authorization.     DEMI HackettN, RN-BC  Patient Care Supervisor  [facility] [place]

## 2021-02-11 ENCOUNTER — TELEPHONE (OUTPATIENT)
Dept: PALLIATIVE MEDICINE | Facility: CLINIC | Age: 36
End: 2021-02-11

## 2021-02-11 ENCOUNTER — MYC MEDICAL ADVICE (OUTPATIENT)
Dept: PALLIATIVE MEDICINE | Facility: CLINIC | Age: 36
End: 2021-02-11

## 2021-02-11 DIAGNOSIS — Z20.822 ENCOUNTER FOR LABORATORY TESTING FOR COVID-19 VIRUS: Primary | ICD-10-CM

## 2021-02-11 DIAGNOSIS — M54.50 CHRONIC MIDLINE LOW BACK PAIN WITHOUT SCIATICA: ICD-10-CM

## 2021-02-11 DIAGNOSIS — G89.29 CHRONIC MIDLINE LOW BACK PAIN WITHOUT SCIATICA: ICD-10-CM

## 2021-02-11 NOTE — TELEPHONE ENCOUNTER
See TE from yesterday      Suzanne YOON    [place] [facility]

## 2021-02-11 NOTE — TELEPHONE ENCOUNTER
Screening Questions for RFA Procedure      Procedure ordered? Repeat LRFA    What insurance are we billing for this procedure?  [employer]    Has patient had this injection before? Yes:   This procedure requires that a COVID-19 lab test be done within 4 days of the procedure.   If patient asks why? We will not always be able to maintain a 6' distance, the procedure takes a long time, there will be more people in a smaller area, and use of sedation medication increases the risk of respiratory treatments.    Would you still like to move forward with scheduling the procedure?  Yes  If YES, let patient know that someone will call them to schedule the COVID-19 test nd that they will only receive a call back if the result is positive. Route to nursing to enter order.   Any chance of pregnancy? Not Applicable   If YES, do NOT schedule and route to RN pool     Is  Needed?: No  Will patient have a ?  Yes   If pt is given sedation meds, no driving for 24 hours.  Is pt taking a cab or transportation service? YES:         If so will need to be accompanied by an adult too (friend/family member) in order for IV sedation to be given.      Per [facility] Policy:  Outpatients are to have responsible adult or family member to accompany them at discharge and drive them home. A service providing medically trained drivers or attendants would be acceptable. Public transportation would not be acceptable unless the patient is accompanied by a responsible adult or family member.  Is patient taking any blood thinners (i.e. plavix, coumadin, jantoven, warfarin, heparin, pradaxa or dabigatran, etc)? No   If YES, do NOT schedule, and route to RN pool    Is patient taking any aspirin products? No     If more than 325mg/day, OK to schedule; Instruct pt to decrease to less than 325 mg for 7 days AND route to RN pool    For CERVICAL procedures, hold all aspirin products for 6 days.     Tell pt that if aspirin product is not held for 6  days, the procedure WILL BE cancelled.      Does the patient have a bleeding or clotting disorder? No     If YES, it it OKAY to schedule AND route to RN pool    **For any patients with platelet count <100, must be forwarded to provider**    Is patient diabetic? No If YES, have them bring their glucometer.    Does patient have an active infection or treated for one within the past week? No   If YES, do NOT schedule and route to RN nurse pool     Is patient currently taking any antibiotics?  No    For patients on chronic, preventative, or prophylactic antibiotics, procedures may be scheduled.     For patients on antibiotics for active or recent infection:antibiotic course must have been completed for 4 days    Is patient currently taking any steroid medications? (i.e. Prednisone, Medrol)  No     For patients on steroid medications, course must have been completed for 4 days    Is patient actively being treated for cancer or immunocompromised, including the spleen having been removed? No  If YES, do NOT schedule and route to RN pool     Any history of complications with sedation medications?  NO   If YES, OK to schedule AND route to RN pool     Any history of sleep apnea?  NO   If YES, OK to schedule AND route to RN pool     Any cardiac history?  NO   If YES, OK to schedule AND route to RN pool     Do you have an implanted pacemaker, ICD (implanted cardiac device) or AICD (automatic implanted cardiac device)?  NO    If YES, do NOT schedule AND route to RN pool.     Obtain name of device :       Obtain name of cardiologist:       Do you have an implanted stimulator?  NO    If YES, OK to schedule AND route to nursing.     Instruct patient to bring in the remote to the appointment and it will need to be turned off.        Does patient have an allergy to contrast dye, iodine or shellfish?  No   If YES, OK to schedule. Route to RN pool AND add allergy information to appointment notes    Are you able to get on  and off an exam table with minimal or no assistance? Yes   If NO, do NOT schedule and route to RN pool    Are you able to roll over and lay on your stomach with minimal or no assistance? Yes   If NO, do NOT schedule and route to RN pool    Reminders:    If you are started on any steroids or antibiotics between now and your appointment, you must contact us because it may affect our ability to perform your procedure.  Yes    Informed patient that s/he needs to fast for 6 hours before procedure?  YES    Informed patient that it is OK to take normal medications with sips of water, especially blood pressure medications, before the procedure and must hold blood thinners as instructed.  Yes    Informed patient to arrive 30 minutes before procedure time to have an IV inserted.  reviewed   Do NOT schedule at 0745, 0815 or 1245.  reviewed     All radiofrequency ablations are in a 90 minute time slot.  reviewed   Amber LANDIS    [facility]

## 2021-02-11 NOTE — TELEPHONE ENCOUNTER
Radiofrequency ablation rescheduled to 2/18/21.  COVID order in.    Jennifer RN-BSN  [facility]

## 2021-02-11 NOTE — TELEPHONE ENCOUNTER
Order received for repeat BIlateral Lumbar RFA L4-S1.    Routing to review insurance.    Amber LANDIS    [facility]

## 2021-02-11 NOTE — TELEPHONE ENCOUNTER
No PA required  Reference #[id number]    [employer] Medical policy for Repeat RFA:            Okay to schedule      Suzanne YOON    [facility]

## 2021-02-11 NOTE — TELEPHONE ENCOUNTER
Radiofrequency ablation rescheduled to 2/18/21.  COVID order in.    Mychart sent to pt:  From: Jennifer Avalos RN      Created: 2/11/2021 2:28 PM        Okay so I have you rescheduled:  Appointment for radiofrequency ablation: Thursday, 2/18/21. Arrive at 0915 for a 0945 injection.   Location: [facility]   A COVID test is needed.  I would call asap to get this scheduled at [phone].     Let me know if you have any further questions.         Will keep encounter open for a couple of days in anticipation of patient call back.    INDIANA Rodriguez-BSN  [facility]

## 2021-02-11 NOTE — TELEPHONE ENCOUNTER
Oxycodone is not prescribed by Dr. Kahn.     Pt had office visit on 2/10/21  No mention of Dr. Kahn taking over prescribing.      2/10/21 office visit.  Assessment/Plan:  Moris Bowers is a 32 year old male who presents with the complaints of axial LBP and neck pain radiating to his lat elbow.     Moris was seen today for pain.     Diagnoses and all orders for this visit:     Spondylosis of lumbar region without myelopathy or radiculopathy  -     PAIN INJECTION EVAL/TREAT/FOLLOW UP        - consider repeat cervical epidural   -Order placed for repeat bilateral lumbar RFA L4-S1  - Medication Management:               -Consider nabumetone instead of Motrin we will hold off at this time              -Continue Flexeril consider consider a alternative agent.  - Physical Therapy: Continue home regimen     - Follow up: For procedure     The total TIME spent on this patient on the day of the appointment was 30 minutes.   Time spent preparing to see the patient (reviewing records and tests)  Time spend face to face with the patient  Time spent ordering tests, medications, procedures and referrals  Time spent Referring and communicating with other healthcare professionals  Documenting clinical information in Epic       Will forward to Dr. Kahn to review and advise.    Kel Karimi, RN  Care Coordinator   [facility] [place]

## 2021-02-11 NOTE — TELEPHONE ENCOUNTER
Received call from patient  requesting refill(s) for oxyCODONE-acetaminophen (PERCOCET) 5-325 MG tablet     Last dispensed from pharmacy on 2/3/2021    Patient's last office/virtual visit by prescribing provider on 2/10/2021  Next office/virtual appointment scheduled for 3/12/2021    Last urine drug screen date 12/22/2017  Current opioid agreement on file? No Date of opioid agreement:     E-prescribe to:    [address] [address]  Phone: [phone] Fax: [phone]    Will route to nursing [place] for review and preparation of prescription(s).

## 2021-02-12 ENCOUNTER — MYC REFILL (OUTPATIENT)
Dept: FAMILY MEDICINE | Facility: OTHER | Age: 36
End: 2021-02-12

## 2021-02-12 ENCOUNTER — MYC MEDICAL ADVICE (OUTPATIENT)
Dept: FAMILY MEDICINE | Facility: OTHER | Age: 36
End: 2021-02-12

## 2021-02-12 DIAGNOSIS — G89.29 CHRONIC MIDLINE LOW BACK PAIN WITHOUT SCIATICA: ICD-10-CM

## 2021-02-12 DIAGNOSIS — M54.50 CHRONIC MIDLINE LOW BACK PAIN WITHOUT SCIATICA: ICD-10-CM

## 2021-02-12 RX ORDER — OXYCODONE AND ACETAMINOPHEN 5; 325 MG/1; MG/1
1 TABLET ORAL EVERY 6 HOURS PRN
Qty: 24 TABLET | Refills: 0 | Status: CANCELLED | OUTPATIENT
Start: 2021-02-12

## 2021-02-12 RX ORDER — OXYCODONE AND ACETAMINOPHEN 5; 325 MG/1; MG/1
1 TABLET ORAL EVERY 6 HOURS PRN
Qty: 24 TABLET | Refills: 0 | Status: SHIPPED | OUTPATIENT
Start: 2021-02-12 | End: 2021-07-06

## 2021-02-12 NOTE — TELEPHONE ENCOUNTER
Duplicate request, request sent earlier today and routed to PCP who is out of office. Please see refill request from today.    Would covering providers be willing to refill patient's pain medication?    Girish Neumann RN, BSN

## 2021-02-12 NOTE — TELEPHONE ENCOUNTER
Audemat message from patient on 2/11 at 1430:    I will call and schedule thank you. I was confused, I didn't know Thursdays were an option. Has my prescription been sent to the pharmacy yet, or is this not the proper channel to inquire.    -------------  Message sent to pt:    Arjun Niño,     I see that there was a separate [employer] encounter with a refill request and that's where correspondence about that will occur.  We are clarifying whether Dr. Kahn will be taking over prescribing of the percocet; it was not specifically documented in the visit note. We will get back to you via the other encounter regarding his response about your refill request.     Take care,     DEMI HackettN, RN-BC  Patient Care Supervisor  [facility] [place]

## 2021-02-14 NOTE — TELEPHONE ENCOUNTER
Sorry for the miscommunication, but review of  patient was able to refill a Rx by PCP.  He should have adequate amount to make it to his RFA.  After the procedure I am comfortable prescribing a short Rx for post procedural pain.  Long-term goal not to be on daily opioids.  Sorry for any confusion about refills.

## 2021-02-15 DIAGNOSIS — Z20.822 ENCOUNTER FOR LABORATORY TESTING FOR COVID-19 VIRUS: ICD-10-CM

## 2021-02-15 LAB
SARS-COV-2 RNA RESP QL NAA+PROBE: NORMAL
SPECIMEN SOURCE: NORMAL

## 2021-02-15 PROCEDURE — U0003 INFECTIOUS AGENT DETECTION BY NUCLEIC ACID (DNA OR RNA); SEVERE ACUTE RESPIRATORY SYNDROME CORONAVIRUS 2 (SARS-COV-2) (CORONAVIRUS DISEASE [COVID-19]), AMPLIFIED PROBE TECHNIQUE, MAKING USE OF HIGH THROUGHPUT TECHNOLOGIES AS DESCRIBED BY CMS-2020-01-R: HCPCS | Performed by: PAIN MEDICINE

## 2021-02-15 PROCEDURE — U0005 INFEC AGEN DETEC AMPLI PROBE: HCPCS | Performed by: PAIN MEDICINE

## 2021-02-16 LAB
LABORATORY COMMENT REPORT: NORMAL
SARS-COV-2 RNA RESP QL NAA+PROBE: NEGATIVE
SPECIMEN SOURCE: NORMAL

## 2021-02-18 ENCOUNTER — RADIOLOGY INJECTION OFFICE VISIT (OUTPATIENT)
Dept: PALLIATIVE MEDICINE | Facility: CLINIC | Age: 36
End: 2021-02-18
Attending: PAIN MEDICINE
Payer: COMMERCIAL

## 2021-02-18 VITALS
HEART RATE: 96 BPM | DIASTOLIC BLOOD PRESSURE: 93 MMHG | RESPIRATION RATE: 16 BRPM | SYSTOLIC BLOOD PRESSURE: 151 MMHG | OXYGEN SATURATION: 97 %

## 2021-02-18 DIAGNOSIS — M47.816 SPONDYLOSIS OF LUMBAR REGION WITHOUT MYELOPATHY OR RADICULOPATHY: ICD-10-CM

## 2021-02-18 DIAGNOSIS — M47.817 LUMBOSACRAL SPONDYLOSIS WITHOUT MYELOPATHY: ICD-10-CM

## 2021-02-18 PROCEDURE — 64636 DESTROY L/S FACET JNT ADDL: CPT | Mod: 50 | Performed by: PAIN MEDICINE

## 2021-02-18 PROCEDURE — 64635 DESTROY LUMB/SAC FACET JNT: CPT | Mod: 50 | Performed by: PAIN MEDICINE

## 2021-02-18 PROCEDURE — 99153 MOD SED SAME PHYS/QHP EA: CPT | Performed by: PAIN MEDICINE

## 2021-02-18 PROCEDURE — 99152 MOD SED SAME PHYS/QHP 5/>YRS: CPT | Performed by: PAIN MEDICINE

## 2021-02-18 RX ORDER — FENTANYL CITRATE 50 UG/ML
12.5-25 INJECTION, SOLUTION INTRAMUSCULAR; INTRAVENOUS EVERY 5 MIN PRN
Status: ACTIVE | OUTPATIENT
Start: 2021-02-18

## 2021-02-18 RX ORDER — OXYCODONE AND ACETAMINOPHEN 5; 325 MG/1; MG/1
1 TABLET ORAL EVERY 12 HOURS PRN
Qty: 10 TABLET | Refills: 0 | Status: SHIPPED | OUTPATIENT
Start: 2021-02-18 | End: 2021-07-06

## 2021-02-18 RX ORDER — KETOROLAC TROMETHAMINE 30 MG/ML
15-30 INJECTION, SOLUTION INTRAMUSCULAR; INTRAVENOUS ONCE
Status: COMPLETED | OUTPATIENT
Start: 2021-02-18 | End: 2021-02-18

## 2021-02-18 RX ADMIN — FENTANYL CITRATE 25 MCG: 50 INJECTION, SOLUTION INTRAMUSCULAR; INTRAVENOUS at 10:28

## 2021-02-18 RX ADMIN — FENTANYL CITRATE 25 MCG: 50 INJECTION, SOLUTION INTRAMUSCULAR; INTRAVENOUS at 10:43

## 2021-02-18 RX ADMIN — KETOROLAC TROMETHAMINE 30 MG: 30 INJECTION, SOLUTION INTRAMUSCULAR; INTRAVENOUS at 10:05

## 2021-02-18 RX ADMIN — FENTANYL CITRATE 25 MCG: 50 INJECTION, SOLUTION INTRAMUSCULAR; INTRAVENOUS at 10:13

## 2021-02-18 RX ADMIN — FENTANYL CITRATE 25 MCG: 50 INJECTION, SOLUTION INTRAMUSCULAR; INTRAVENOUS at 10:08

## 2021-02-18 RX ADMIN — FENTANYL CITRATE 25 MCG: 50 INJECTION, SOLUTION INTRAMUSCULAR; INTRAVENOUS at 10:22

## 2021-02-18 RX ADMIN — FENTANYL CITRATE 50 MCG: 50 INJECTION, SOLUTION INTRAMUSCULAR; INTRAVENOUS at 10:04

## 2021-02-18 ASSESSMENT — PAIN SCALES - GENERAL: PAINLEVEL: EXTREME PAIN (8)
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
## 2021-02-18 NOTE — NURSING NOTE
Pre-procedure Intake    Have you been fasting? Yes    If yes, for how long? More than 6 hours    Are you taking a prescribed blood thinner such as coumadin, Plavix, Xarelto?    No    If yes, when did you take your last dose? NA    Do you take aspirin?  No    If cervical procedure, have you held aspirin for 6 days?   NA    Do you have any allergies to contrast dye, iodine, steroid and/or numbing medications?  NO    Are you currently taking antibiotics or have an active infection?  NO    Have you had a fever/elevated temperature within the past week? NO    Are you currently taking oral steroids? NO    Do you have a ? Yes       Are you pregnant or breastfeeding?  Not Applicable    Are the vital signs normal?  No: BP Lore Newsome CMA ([facility])

## 2021-02-18 NOTE — PATIENT INSTRUCTIONS
[facility]   Radiofrequency Ablation (RFA) Discharge Instructions     Today you saw:     Dr. Gal Kahn        You should anticipate procedural pain for up to 2 weeks.     You may receive a prescription for pain medication and you should take this as directed.     It may take up to 8 weeks to receive relief from the RFA     Follow up with your provider in 6 weeks.     If you received sedation before, during or after your procedure, for the next 24 hours you shall NOT:    -Drive    -Operate machinery    -Drink alcohol    -Sign any legal documents     You may resume your normal diet     You may resume your regular medications after the procedure     If you were holding your blood thinning medication, please restart taking it: N/A    Be cautious with walking. Numbness and/or weakness in the lower extremities may occur for up to 6-8 hours due to effect of local anesthetic    Avoid strenuous activity for the first 24 hours     You may resume your regular activities after 24 hours     You may shower, however no swimming, tub baths or hot tubs for 24 hours following your procedure     You may use ice packs 10-15 minutes three to four times a day at the injection site for comfort     Do not use heat to painful areas for 6 to 8 hours. This will give the local anesthetic time to wear off and prevent you from accidentally burning your skin.     Unless you have been directed to avoid the use of anti-inflammatory medications (NSAIDS), you may use medications such as ibuprofen, Aleve or Tylenol for pain control if needed.     If you experience any of the following, call the Pain Clinic during work hours (Monday through Friday 8 am-4:30 pm) at [phone] or the Provider Line after hours at [phone]:   -Fever over 100 degree F    -Swelling, bleeding, redness, drainage, warmth at the injection site    -Progressive weakness or numbness in your legs   -Loss of bowel or bladder function     -Unusual new onset of pain that is not improving

## 2021-02-18 NOTE — NURSING NOTE
Discharge Information    IV Discontiued Time:  1105    Amount of Fluid Infused:  300    Discharge Criteria = When patient returns to baseline or as per MD order    Consciousness:  Pt is fully awake    Circulation:  BP +/- 20% of pre-procedure level    Respiration:  Patient is able to breathe deeply    O2 Sat:  Patient is able to maintain O2 Sat >92% on room air    Activity:  Moves 4 extremities on command    Ambulation:  Patient is able to stand and walk or stand and pivot into wheelchair    Dressing:  Clean/dry or No Dressing    Notes:   Discharge instructions and AVS given to patient    Patient meets criteria for discharge?  YES    Admitted to PCU?  No    Responsible adult present to accompany patient home?  Yes    Signature/Title:    Kel Karimi RN  RN Care Coordinator  [facility]

## 2021-02-18 NOTE — NURSING NOTE
MD Time IN: 1000   Sedation start time:  1001  Sedation end time:  1100    Medications given: fentanyl 175 mcg IV; versed 4 mg IV; toradol 30 mg IV  Intravenous fluids were administered, normal saline 300 cc's.  Sedation Level Achieved:  Moderate (conscious) sedation    Kel Karimi, RN  Care Coordinator   [facility]

## 2021-03-23 NOTE — PROGRESS NOTES
SUBJECTIVE:   Moris Bowers is a 31 year old male who presents to clinic today for the following health issues: wife is also present today     Concern - Blood in Stool  Onset: this morning    Description:   Since Monday has had chills/sweat and went to the bathroom this morning and there was blood in stool. Has been feeling not well     Intensity: moderate    Progression of Symptoms:  worsening    Accompanying Signs & Symptoms:  Has not been feeling himself    Previous history of similar problem:   no    Precipitating factors:   Worsened by: unsure    Alleviating factors:  Improved by: nothing    Therapies Tried and outcome: dayquil/nyquil but none of that is working    Has the chills currently, can't sleep, no appetite, tried eating cereal this morning but couldn't. The chills is his main concern. No vomiting yet. Started giving plasma few weeks ago-last one was Friday. Blood in stool today was more pink than red, was when he wiped, could not tell if in stool but saw red spots in stool. Bowl was not pink. Bowels have been more loose than normal. Normally has back pain, but this feels different. This is more general body aches. Never had the flu shot. No headaches/dizziness, no SOB or chest pain. No urinating issues. Denies being around anyone who has been ill. No headache/dizziness. No SOB or chest pain.     Just diagnosed with Juvinalle disc disease.       Problem list and histories reviewed & adjusted, as indicated.  Additional history: as documented    Patient Active Problem List   Diagnosis     Inguinal hernia     Pigmented skin lesions     CARDIOVASCULAR SCREENING; LDL GOAL LESS THAN 160     Obesity     Impaired fasting glucose     Edema     Chronic pain syndrome     Chronic low back pain, unspecified back pain laterality, with sciatica presence unspecified     Tobacco abuse     Past Surgical History:   Procedure Laterality Date     BACK SURGERY       C BONE CUTTING SEGMENTED      Foot  spur     C  EXPLORATION OF EPIDIDYMIS      cyst     HC KNEE SCOPE, DIAGNOSTIC  /    Arthroscopy, Knee     HC LAP,INGUINAL HERNIA REPR,INITIAL  12/06/2005    Bilateral indirect inguinal hernias.     LAPAROSCOPIC APPENDECTOMY  6/30/2011    Procedure:LAPAROSCOPIC APPENDECTOMY; Surgeon:AHMET MATTHEWS; Location:[facility] OR       Social History   Substance Use Topics     Smoking status: Current Some Day Smoker     Years: 8.00     Types: e-Cigarettes or another electronic nicotine delivery system     Last attempt to quit: 6/24/2013     Smokeless tobacco: Former User      Comment: E-cig     Alcohol use 0.0 oz/week     0 Standard drinks or equivalent per week      Comment: rare     Family History   Problem Relation Age of Onset     DIABETES Father      Hypertension Father      CEREBROVASCULAR DISEASE Father      Alcohol/Drug Father      Arthritis Father      Depression Father      HEART DISEASE Father      Lipids Father      Neurologic Disorder Father      Obesity Father      CANCER Father      testicular     Parkinsonism Father      Seizure Disorder Father      Breast Cancer Maternal Grandmother      Arthritis Maternal Grandmother      Depression Maternal Grandmother      Lipids Maternal Grandmother      Obesity Maternal Grandmother      Respiratory Maternal Grandmother      Parkinsonism Maternal Grandmother      Alcohol/Drug Mother      Depression Mother      Breast Cancer Mother      Arthritis Maternal Grandfather      Parkinsonism Maternal Grandfather      Alzheimer Disease Maternal Grandfather      Arthritis Paternal Grandmother      Parkinsonism Paternal Grandmother      Arthritis Paternal Grandfather      Parkinsonism Paternal Grandfather      Family History Negative Other      DIABETES Maternal Aunt      DIABETES Maternal Uncle      CANCER Maternal Aunt      female     CANCER Maternal Uncle      skin0.     Ovarian Cancer No family hx of      Prostate Cancer No family hx of      Mental Illness No family hx of      Asthma No  family hx of      Known Genetic Syndrome No family hx of      Thyroid Disease No family hx of          Current Outpatient Prescriptions   Medication Sig Dispense Refill     cyclobenzaprine (FLEXERIL) 10 MG tablet TAKE ONE-HALF TO ONE TABLET BY MOUTH THREE TIMES A DAY AS NEEDED FOR MUSCLE SPASM  0     aspirin-acetaminophen-caffeine (EXCEDRIN MIGRAINE) 250-250-65 MG per tablet Take 1 tablet by mouth every 6 hours as needed for headaches Reported on 5/18/2017       ibuprofen (ADVIL,MOTRIN) 200 MG tablet Take 4 tablets (800 mg) by mouth every 8 hours as needed for mild pain 1 tablet      Allergies   Allergen Reactions     No Known Drug Allergies      Wellbutrin [Bupropion] Other (See Comments)     paranoia         Reviewed and updated as needed this visit by clinical staffTobacco  Allergies  Meds  Problems  Med Hx  Surg Hx  Fam Hx  Soc Hx        Reviewed and updated as needed this visit by Provider  Allergies  Meds  Problems  Med Hx  Surg Hx  Fam Hx         ROS:  Constitutional, HEENT, cardiovascular, pulmonary, gi and gu systems are negative, except as otherwise noted in HPI.      OBJECTIVE:   /82 (BP Location: Right arm, Patient Position: Sitting, Cuff Size: Adult Large)  Pulse 82  Temp 98.8  F (37.1  C) (Oral)  Resp 12  Wt 114.9 kg (253 lb 3.2 oz)  SpO2 99%  BMI 35.14 kg/m2  Body mass index is 35.14 kg/(m^2).  GENERAL: ill appearing, tired, alert and no acute distress. +chills currently  EYES: Eyes grossly normal to inspection, PERRL and conjunctivae and sclerae normal  HENT: ear canals and TM's normal, nose and mouth without ulcers or lesions. Posterior pharynx slight erythema, no exudate  NECK: no adenopathy, no asymmetry, masses, or scars and thyroid normal to palpation. Non-tender  RESP: lungs clear to auscultation - no rales, rhonchi or wheezes. No SOB, no cough  CV: regular rate and rhythm, normal S1 S2, no S3 or S4, no murmur, click or rub, no chest pain  ABDOMEN: soft, obese, very  tender to light palpation in middle to lower quadrants, no rebound tenderness but pushing in upper middle quadrant did make lower quadrants tender, no hepatosplenomegaly, no masses and bowel sounds normal. LBM today-loose  MS: +muscle aches, no gross musculoskeletal defects noted, no edema  PSYCH: mentation appears normal, affect normal    Diagnostic Test Results:  Results for orders placed or performed in visit on 10/11/17 (from the past 24 hour(s))   CBC with platelets   Result Value Ref Range    WBC 11.2 (H) 4.0 - 11.0 10e9/L    RBC Count 5.52 4.4 - 5.9 10e12/L    Hemoglobin 16.0 13.3 - 17.7 g/dL    Hematocrit 46.2 40.0 - 53.0 %    MCV 84 78 - 100 fl    MCH 29.0 26.5 - 33.0 pg    MCHC 34.6 31.5 - 36.5 g/dL    RDW 13.8 10.0 - 15.0 %    Platelet Count 254 150 - 450 10e9/L       ASSESSMENT/PLAN:       1. Chills  WBC only slightly elevated-11.2. Other lab workup mostly negative for signs or reasons for infection. Gave antibiotic-zpack as patient was insistent on starting one. Discussed that without having a clear cause for infection, this could just be viral and an antibiotic may not improve symptoms. He verbalized understanding. RTC in 1-2 days if not improving for further workup. He declined wanting to go to the ER despite negative lab workup.   - CBC with platelets  - Comprehensive metabolic panel (BMP + Alb, Alk Phos, ALT, AST, Total. Bili, TP)    2. Abdominal pain, generalized  Discussed possible reasons for abdominal pain: influenza, pancreatitis, not appendicitis as has that surgically removed, possible spleen, infectious diarrhea/gastroenteritis. Could send for outpatient abdominal CT scan but he would rather go to ER vs outpatient CT scan. X-ray showed some stool in bowels and final result again showed some stool, no free air. No desire to go to ER, monitor for now. Could just be viral gastroenteritis with chills, unlikely the flu.    - Lipase  - XR Abdomen 2 Views  - Amylase    3. Blood in stool  Was  slightly pink with wiping, thought he saw some red in his stool but the bowl was not pink or red. Discussed with having looser stools could be from micro tears in rectum causing the pink. No family or personal history of blood in stool. Monitor for now  - Fecal colorectal cancer screen (FIT); Future    FUTURE APPOINTMENTS:       - Follow-up visit in 1-2 days if not improving    HALEIGH Persaud, NP-C  [facility]     Detail Level: Zone Continue Regimen: Ketoconazole shampoo,Fluocinonide solution 0.05% to scalp as needed. Plan: Recommend the following Over-The-Counter treatment(s): Salicylic acid shampoo. Render In Strict Bullet Format?: No Plan: Recommend the following Over-The-Counter treatment(s): Men’s Rogaine foam.\\nPt stated that she is experience constipation,recommended upping water intake. Continue Regimen: Spironolactone 100 QD Continue Regimen: Finacea, ivermectin

## 2021-03-29 ENCOUNTER — MYC MEDICAL ADVICE (OUTPATIENT)
Dept: PALLIATIVE MEDICINE | Facility: CLINIC | Age: 36
End: 2021-03-29

## 2021-03-29 NOTE — TELEPHONE ENCOUNTER
Per patient [employer] message:  From: Salinas Bowers      Created: 3/29/2021 10:39 AM      not, new but more intense. So wait a few more weeks and see how it goes?      ____________    [employer] sent to pt:  From: Jennifer Avalos RN      Created: 3/29/2021 11:00 AM      Yes you should wait the full 8 weeks for full response.   Is your pain right now manageable? Let me know if it isn't.     If you want to be proactive you could schedule your next visit with Dr. Kahn for anytime after 4/11/21.     I will have Dr. Kahn review.  I will only get back to you if he has a different recommendation.       INDIANA Rodriguez-BSN  [facility]

## 2021-03-29 NOTE — TELEPHONE ENCOUNTER
Per patient [employer] message:  From: Salinas FARIA [place]      Created: 3/29/2021 10:07 AM      Good Morning Dr. Kahn. It has been about a month since the ablation was done. I do not remember the recovery process the last time this was done. I am waking up in pain and it doesn't go away, by the end of the day I feel Like I am back where I was before the ablation. Pain is traveling through my hips and down the backs of my legs.       Is there a possibility that there is something else going on in my back? or is this just part of the ablation recovery?  What do you recommend, should I make an appointment with you, or my primary.      Sorry to bombard you with questions, any direction would be greatly appreciated.      ____________    2/18/21: lumbar radiofrequency ablation    [email or web address] sent to pt:  From: Jennifer Avalos RN      Created: 3/29/2021 10:38 AM      Arjun Niño,  It can take up to 8 weeks for full response from the radiofrequency ablation.  If the hip and back of leg pain new?     INDIANA Rodriguez-BSN  [facility]

## 2021-07-03 ENCOUNTER — TELEPHONE (OUTPATIENT)
Dept: FAMILY MEDICINE | Facility: OTHER | Age: 36
End: 2021-07-03

## 2021-07-03 NOTE — PROGRESS NOTES
Assessment & Plan     ICD-10-CM    1. Acute streptococcal pharyngitis  J02.0 amoxicillin (AMOXIL) 875 MG tablet   2. Throat pain  R07.0 Streptococcus A Rapid Scr w Reflx to PCR     Symptomatic COVID-19 Virus (Coronavirus) by PCR     Symptomatic COVID-19 Virus (Coronavirus) by PCR     - Patient with 3-4 days of throat pain, no known exposure  - Recommend COVID and strep swab, patient in agreement   - Discussed positive strep test      Discussed etiology, treatment, contagious, etc  - Hand out given  - Discussed changing toothbrush after 24 hours and antibiotics  - Discussed symptomatic cares   - Will await COVID swab, co infection is possible       Review of the result(s) of each unique test - strep swab   Diagnosis or treatment significantly limited by social determinants of health - None     20 minutes spent on the date of the encounter doing chart review, history and exam, documentation and further activities as noted above    The patient indicates understanding of these issues and agrees with the plan.    Return in about 1 week (around 7/13/2021) for if not improving.    I, Jesse Bruno PA-C,  was present with the PA student who participated in the service and in the documentation of the note.  I have verified the history and personally performed the physical exam and medical decision making.  I agree with the assessment and plan of care as documented in the note.     ANUJ Phillips-S2  [employer] Cassondra MARTELL Bruno PA-C  [facility]   Salinas is a 35 year old who presents for the following health issues     HPI     Acute Illness  Acute illness concerns: sore throat  Onset/Duration: 3 days  Symptoms:  Fever: YES  Chills/Sweats: no  Headache (location?): no  Sinus Pressure: no  Conjunctivitis:  no  Ear Pain: no  Rhinorrhea: no  Congestion: no  Sore Throat: YES  Cough: no  Wheeze: no  Decreased Appetite: YES  Nausea:  "YES  Vomiting: YES  Diarrhea: no  Dysuria/Freq.: no  Dysuria or Hematuria: no  Fatigue/Achiness: YES  Sick/Strep Exposure: no  Therapies tried and outcome: dyquil, tylenol, Ibprofen    -sore throat and fever Saturday, hurts to swallow  -white exudate then redness  -upset stomach, vomiting, nausea  - minimal nasal drainage  - no headache, sinus prssure, ear pain  - no sick contacts, no recent hx of strep  - no loss of taste or smell        Review of Systems   Constitutional, HEENT, cardiovascular, pulmonary, gi and gu systems are negative, except as otherwise noted.      Objective    /70   Pulse 93   Temp 99.7  F (37.6  C) (Temporal)   Resp 16   Ht 1.816 m (5' 11.5\")   Wt 97.5 kg (215 lb)   SpO2 98%   BMI 29.57 kg/m    Body mass index is 29.57 kg/m .  Physical Exam   GENERAL APPEARANCE: mildly ill appearing, alert and no distress  EYES: Eyes grossly normal to inspection, PERRLA, conjunctivae and sclerae without injection or discharge, EOM intact   HENT: Bilateral ear canals without erythema or cerumen, bilateral TM's pearly grey with normal light reflex, no effusion, injection, or bulging, nasal turbinates with mild swelling and erythema, clear nasal discharge, mouth without ulcers or lesions, oropharynx erythematous with mild edema and no exudates, oral mucous membranes moist, no sinus tenderness   NECK: Bilateral anterior cervical and submandibular adenopathy, no adenopathy in cervical or supraclavicular regions  RESP: Lungs clear to auscultation - no rales, rhonchi or wheezes   CV: Regular rates and rhythm, normal S1 S2, no S3 or S4, no murmur, click or rub  MS: No musculoskeletal defects are noted and gait is age appropriate without ataxia   SKIN: No suspicious lesions or rashes, hydration status appears adeuqate with normal skin turgor   PSYCH: Alert and oriented x3; speech- coherent , normal rate and volume; able to articulate logical thoughts, able to abstract reason, no tangential thoughts, no " hallucinations or delusions, mentation appears normal, Mood is euthymic. Affect is appropriate for this mood state and bright. Thought content is free of suicidal ideation, hallucinations, and delusions. Dress is adequate and upkept. Eye contact is good during conversation. '      Diagnostics:   Results for orders placed or performed in visit on 07/06/21   Streptococcus A Rapid Scr w Reflx to PCR     Status: Abnormal    Specimen: Throat   Result Value Ref Range    Strep Specimen Description Throat     Streptococcus Group A Rapid Screen Positive (A) NEG^Negative     See remaining orders pending in [facility]

## 2021-07-03 NOTE — TELEPHONE ENCOUNTER
Could RN please triage pt. 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 Manasa YOON CMA

## 2021-07-06 ENCOUNTER — OFFICE VISIT (OUTPATIENT)
Dept: FAMILY MEDICINE | Facility: OTHER | Age: 36
End: 2021-07-06
Payer: OTHER GOVERNMENT

## 2021-07-06 VITALS
WEIGHT: 215 LBS | BODY MASS INDEX: 30.1 KG/M2 | DIASTOLIC BLOOD PRESSURE: 70 MMHG | SYSTOLIC BLOOD PRESSURE: 126 MMHG | HEART RATE: 93 BPM | HEIGHT: 71 IN | RESPIRATION RATE: 16 BRPM | TEMPERATURE: 99.7 F | OXYGEN SATURATION: 98 %

## 2021-07-06 DIAGNOSIS — R07.0 THROAT PAIN: ICD-10-CM

## 2021-07-06 DIAGNOSIS — J02.0 ACUTE STREPTOCOCCAL PHARYNGITIS: Primary | ICD-10-CM

## 2021-07-06 LAB
DEPRECATED S PYO AG THROAT QL EIA: POSITIVE
SARS-COV-2 RNA RESP QL NAA+PROBE: NORMAL
SPECIMEN SOURCE: ABNORMAL
SPECIMEN SOURCE: NORMAL

## 2021-07-06 PROCEDURE — 99213 OFFICE O/P EST LOW 20 MIN: CPT | Performed by: PHYSICIAN ASSISTANT

## 2021-07-06 PROCEDURE — U0003 INFECTIOUS AGENT DETECTION BY NUCLEIC ACID (DNA OR RNA); SEVERE ACUTE RESPIRATORY SYNDROME CORONAVIRUS 2 (SARS-COV-2) (CORONAVIRUS DISEASE [COVID-19]), AMPLIFIED PROBE TECHNIQUE, MAKING USE OF HIGH THROUGHPUT TECHNOLOGIES AS DESCRIBED BY CMS-2020-01-R: HCPCS | Performed by: PHYSICIAN ASSISTANT

## 2021-07-06 PROCEDURE — 87880 STREP A ASSAY W/OPTIC: CPT | Performed by: PHYSICIAN ASSISTANT

## 2021-07-06 PROCEDURE — U0005 INFEC AGEN DETEC AMPLI PROBE: HCPCS | Performed by: PHYSICIAN ASSISTANT

## 2021-07-06 RX ORDER — AMOXICILLIN 875 MG
875 TABLET ORAL 2 TIMES DAILY
Qty: 20 TABLET | Refills: 0 | Status: SHIPPED | OUTPATIENT
Start: 2021-07-06 | End: 2021-07-16

## 2021-07-06 ASSESSMENT — MIFFLIN-ST. JEOR: SCORE: 1940.23

## 2021-07-06 ASSESSMENT — PAIN SCALES - GENERAL: PAINLEVEL: MILD PAIN (2)

## 2021-07-06 NOTE — PATIENT INSTRUCTIONS
Patient Education     Pharyngitis: Strep (Confirmed)    You have had a positive test for strep throat. Strep throat is a contagious illness. It's spread by coughing, kissing, sharing glasses or eating utensils, or by touching others after touching your mouth or nose. Symptoms include throat pain that is worse with swallowing, aching all over, headache, swollen lymph nodes at the front of the neck, and red swollen tonsils sometimes with white patches and fever. It's treated with antibiotic medicine. This should help you start to feel better in 1 to 2 days.   Home care    Rest at home. Drink plenty of fluids so you won't get dehydrated.    No work or school for the first 2 days of taking the antibiotics. You can then return to school or work if you are feeling better, have been taking the antibiotic for at least 24 hours and don't have a fever.     Take antibiotic medicine for the full 10 days, even if you feel better. This is very important to ensure the infection is treated completely. It's also important to prevent medicine-resistant germs from developing. If you were given an antibiotic shot, you don't need any more antibiotics.    You may use acetaminophen or ibuprofen to control pain or fever, unless another medicine was prescribed for this. Talk with your healthcare provider before taking these medicines if you have chronic liver or kidney disease or if you have had a stomach ulcer or gastrointestinal bleeding.    Throat lozenges or sprays help reduce pain. Gargling with warm saltwater will also reduce throat pain. Dissolve 1/2 teaspoon of salt in 1 glass of warm water. This may be useful just before meals.     Soft foods and cool or warm fluids are best. Don't eat salty or spicy foods.    Follow-up care  Follow up with your healthcare provider or our staff if you don't get better over the next week.   When to get medical advice  Call your healthcare provider right away or get immediate medical care if any of  these occur:     Fever of 100.4 F (38 C) or higher, or as directed by your healthcare provider    New or worsening ear pain, sinus pain, or headache    Painful lumps in the back of neck    Stiff neck    Lymph nodes getting larger or becoming soft in the middle    You have trouble swallowing liquids or you can't open your mouth wide because of throat pain    Signs of dehydration. These include very dark urine or no urine, sunken eyes, and dizziness.    Noisy breathing    Muffled voice    Rash  Call 911  Call 911right away if you:     Have trouble breathing    Can't swallow or talk    Prevention  Here are steps you can take to help prevent an infection:     Wash your hands often with soap and clean, running water for at least 20 seconds.    Don t have close contact with people who have sore throats, colds, or other upper respiratory infections.    Don t smoke, and stay away from secondhand smoke.  [employer] last reviewed this educational content on 3/1/2020    7964-9541 [employer]. All rights reserved. This information is not intended as a substitute for professional medical care. Always follow your healthcare professional's instructions.

## 2021-10-23 ENCOUNTER — HEALTH MAINTENANCE LETTER (OUTPATIENT)
Age: 36
End: 2021-10-23

## 2022-02-12 ENCOUNTER — HEALTH MAINTENANCE LETTER (OUTPATIENT)
Age: 37
End: 2022-02-12

## 2022-10-09 ENCOUNTER — HEALTH MAINTENANCE LETTER (OUTPATIENT)
Age: 37
End: 2022-10-09

## 2023-03-25 ENCOUNTER — HEALTH MAINTENANCE LETTER (OUTPATIENT)
Age: 38
End: 2023-03-25

## 2023-04-28 NOTE — NURSING NOTE
22 gauge Peripheral IV inserted into left hand - attempts: 1    Kel Karimi, RN  Care Coordinator   [facility] [place]        Reference ECG taken

## 2024-05-25 ENCOUNTER — HEALTH MAINTENANCE LETTER (OUTPATIENT)
Age: 39
End: 2024-05-25